# Patient Record
Sex: FEMALE | Race: BLACK OR AFRICAN AMERICAN | NOT HISPANIC OR LATINO | URBAN - METROPOLITAN AREA
[De-identification: names, ages, dates, MRNs, and addresses within clinical notes are randomized per-mention and may not be internally consistent; named-entity substitution may affect disease eponyms.]

---

## 2021-04-18 ENCOUNTER — INPATIENT (INPATIENT)
Facility: HOSPITAL | Age: 36
LOS: 4 days | Discharge: HOME | DRG: 872 | End: 2021-04-23
Attending: OBSTETRICS & GYNECOLOGY | Admitting: OBSTETRICS & GYNECOLOGY
Payer: MEDICARE

## 2021-04-18 VITALS
WEIGHT: 293 LBS | DIASTOLIC BLOOD PRESSURE: 79 MMHG | HEIGHT: 67 IN | SYSTOLIC BLOOD PRESSURE: 126 MMHG | HEART RATE: 134 BPM | RESPIRATION RATE: 16 BRPM | TEMPERATURE: 98 F | OXYGEN SATURATION: 99 %

## 2021-04-18 LAB
ALBUMIN SERPL ELPH-MCNC: 3.3 G/DL — SIGNIFICANT CHANGE UP (ref 3.3–5)
ALP SERPL-CCNC: 102 U/L — SIGNIFICANT CHANGE UP (ref 40–120)
ALT FLD-CCNC: 8 U/L — LOW (ref 10–45)
ANION GAP SERPL CALC-SCNC: 17 MMOL/L — SIGNIFICANT CHANGE UP (ref 5–17)
ANISOCYTOSIS BLD QL: SIGNIFICANT CHANGE UP
APPEARANCE UR: CLEAR — SIGNIFICANT CHANGE UP
AST SERPL-CCNC: 15 U/L — SIGNIFICANT CHANGE UP (ref 10–40)
BACTERIA # UR AUTO: PRESENT /HPF
BASE EXCESS BLDV CALC-SCNC: -2 MMOL/L — SIGNIFICANT CHANGE UP
BASOPHILS # BLD AUTO: 0 K/UL — SIGNIFICANT CHANGE UP (ref 0–0.2)
BASOPHILS NFR BLD AUTO: 0 % — SIGNIFICANT CHANGE UP (ref 0–2)
BILIRUB SERPL-MCNC: 0.2 MG/DL — SIGNIFICANT CHANGE UP (ref 0.2–1.2)
BILIRUB UR-MCNC: NEGATIVE — SIGNIFICANT CHANGE UP
BLD GP AB SCN SERPL QL: NEGATIVE — SIGNIFICANT CHANGE UP
BUN SERPL-MCNC: 13 MG/DL — SIGNIFICANT CHANGE UP (ref 7–23)
BURR CELLS BLD QL SMEAR: PRESENT — SIGNIFICANT CHANGE UP
CA-I SERPL-SCNC: 1.08 MMOL/L — LOW (ref 1.12–1.3)
CALCIUM SERPL-MCNC: 9.4 MG/DL — SIGNIFICANT CHANGE UP (ref 8.4–10.5)
CHLORIDE SERPL-SCNC: 93 MMOL/L — LOW (ref 96–108)
CO2 SERPL-SCNC: 20 MMOL/L — LOW (ref 22–31)
COLOR SPEC: YELLOW — SIGNIFICANT CHANGE UP
COMMENT - URINE: SIGNIFICANT CHANGE UP
CREAT SERPL-MCNC: 1.28 MG/DL — SIGNIFICANT CHANGE UP (ref 0.5–1.3)
CRP SERPL-MCNC: 412.7 MG/L — HIGH (ref 0–4)
DIFF PNL FLD: ABNORMAL
EOSINOPHIL # BLD AUTO: 0 K/UL — SIGNIFICANT CHANGE UP (ref 0–0.5)
EOSINOPHIL NFR BLD AUTO: 0 % — SIGNIFICANT CHANGE UP (ref 0–6)
EPI CELLS # UR: ABNORMAL /HPF (ref 0–5)
FERRITIN SERPL-MCNC: 92 NG/ML — SIGNIFICANT CHANGE UP (ref 15–150)
GAS PNL BLDV: 129 MMOL/L — LOW (ref 138–146)
GAS PNL BLDV: SIGNIFICANT CHANGE UP
GAS PNL BLDV: SIGNIFICANT CHANGE UP
GIANT PLATELETS BLD QL SMEAR: PRESENT — SIGNIFICANT CHANGE UP
GLUCOSE SERPL-MCNC: 193 MG/DL — HIGH (ref 70–99)
GLUCOSE UR QL: NEGATIVE — SIGNIFICANT CHANGE UP
HCG SERPL-ACNC: <0 MIU/ML — SIGNIFICANT CHANGE UP
HCO3 BLDV-SCNC: 22 MMOL/L — SIGNIFICANT CHANGE UP (ref 20–27)
HCT VFR BLD CALC: 27.9 % — LOW (ref 34.5–45)
HCT VFR BLD CALC: 28.7 % — LOW (ref 34.5–45)
HGB BLD-MCNC: 8.4 G/DL — LOW (ref 11.5–15.5)
HGB BLD-MCNC: 8.5 G/DL — LOW (ref 11.5–15.5)
HYPOCHROMIA BLD QL: SIGNIFICANT CHANGE UP
KETONES UR-MCNC: NEGATIVE — SIGNIFICANT CHANGE UP
LACTATE SERPL-SCNC: 2.4 MMOL/L — HIGH (ref 0.5–2)
LACTATE SERPL-SCNC: 4.2 MMOL/L — CRITICAL HIGH (ref 0.5–2)
LACTATE SERPL-SCNC: 4.4 MMOL/L — CRITICAL HIGH (ref 0.5–2)
LEUKOCYTE ESTERASE UR-ACNC: NEGATIVE — SIGNIFICANT CHANGE UP
LYMPHOCYTES # BLD AUTO: 2.12 K/UL — SIGNIFICANT CHANGE UP (ref 1–3.3)
LYMPHOCYTES # BLD AUTO: 4.3 % — LOW (ref 13–44)
MACROCYTES BLD QL: SIGNIFICANT CHANGE UP
MANUAL SMEAR VERIFICATION: SIGNIFICANT CHANGE UP
MCHC RBC-ENTMCNC: 21 PG — LOW (ref 27–34)
MCHC RBC-ENTMCNC: 21.4 PG — LOW (ref 27–34)
MCHC RBC-ENTMCNC: 29.3 GM/DL — LOW (ref 32–36)
MCHC RBC-ENTMCNC: 30.5 GM/DL — LOW (ref 32–36)
MCV RBC AUTO: 70.1 FL — LOW (ref 80–100)
MCV RBC AUTO: 71.8 FL — LOW (ref 80–100)
MICROCYTES BLD QL: SLIGHT — SIGNIFICANT CHANGE UP
MONOCYTES # BLD AUTO: 2.12 K/UL — HIGH (ref 0–0.9)
MONOCYTES NFR BLD AUTO: 4.3 % — SIGNIFICANT CHANGE UP (ref 2–14)
NEUTROPHILS # BLD AUTO: 45 K/UL — HIGH (ref 1.8–7.4)
NEUTROPHILS NFR BLD AUTO: 89.7 % — HIGH (ref 43–77)
NEUTS BAND # BLD: 1.7 % — SIGNIFICANT CHANGE UP (ref 0–8)
NITRITE UR-MCNC: NEGATIVE — SIGNIFICANT CHANGE UP
NRBC # BLD: 0 /100 WBCS — SIGNIFICANT CHANGE UP (ref 0–0)
OVALOCYTES BLD QL SMEAR: SLIGHT — SIGNIFICANT CHANGE UP
PCO2 BLDV: 35 MMHG — LOW (ref 39–42)
PH BLDV: 7.42 — SIGNIFICANT CHANGE UP (ref 7.32–7.43)
PH UR: 6 — SIGNIFICANT CHANGE UP (ref 5–8)
PLAT MORPH BLD: ABNORMAL
PLATELET # BLD AUTO: 1213 K/UL — CRITICAL HIGH (ref 150–400)
PLATELET # BLD AUTO: 977 K/UL — HIGH (ref 150–400)
PO2 BLDV: 100 MMHG — SIGNIFICANT CHANGE UP
POIKILOCYTOSIS BLD QL AUTO: SIGNIFICANT CHANGE UP
POLYCHROMASIA BLD QL SMEAR: SLIGHT — SIGNIFICANT CHANGE UP
POTASSIUM BLDV-SCNC: 4.2 MMOL/L — SIGNIFICANT CHANGE UP (ref 3.5–4.9)
POTASSIUM SERPL-MCNC: 4.2 MMOL/L — SIGNIFICANT CHANGE UP (ref 3.5–5.3)
POTASSIUM SERPL-SCNC: 4.2 MMOL/L — SIGNIFICANT CHANGE UP (ref 3.5–5.3)
PROCALCITONIN SERPL-MCNC: 11.39 NG/ML — HIGH (ref 0.02–0.1)
PROT SERPL-MCNC: 8 G/DL — SIGNIFICANT CHANGE UP (ref 6–8.3)
PROT UR-MCNC: ABNORMAL MG/DL
RBC # BLD: 3.98 M/UL — SIGNIFICANT CHANGE UP (ref 3.8–5.2)
RBC # BLD: 4 M/UL — SIGNIFICANT CHANGE UP (ref 3.8–5.2)
RBC # FLD: 18.6 % — HIGH (ref 10.3–14.5)
RBC # FLD: 18.6 % — HIGH (ref 10.3–14.5)
RBC BLD AUTO: ABNORMAL
RBC CASTS # UR COMP ASSIST: < 5 /HPF — SIGNIFICANT CHANGE UP
RH IG SCN BLD-IMP: POSITIVE — SIGNIFICANT CHANGE UP
RH IG SCN BLD-IMP: POSITIVE — SIGNIFICANT CHANGE UP
SAO2 % BLDV: 98 % — SIGNIFICANT CHANGE UP
SARS-COV-2 RNA SPEC QL NAA+PROBE: NEGATIVE — SIGNIFICANT CHANGE UP
SARS-COV-2 RNA SPEC QL NAA+PROBE: SIGNIFICANT CHANGE UP
SODIUM SERPL-SCNC: 130 MMOL/L — LOW (ref 135–145)
SP GR SPEC: <=1.005 — SIGNIFICANT CHANGE UP (ref 1–1.03)
SPHEROCYTES BLD QL SMEAR: SLIGHT — SIGNIFICANT CHANGE UP
TARGETS BLD QL SMEAR: SLIGHT — SIGNIFICANT CHANGE UP
UROBILINOGEN FLD QL: 0.2 E.U./DL — SIGNIFICANT CHANGE UP
WBC # BLD: 41.83 K/UL — CRITICAL HIGH (ref 3.8–10.5)
WBC # BLD: 49.23 K/UL — CRITICAL HIGH (ref 3.8–10.5)
WBC # FLD AUTO: 41.83 K/UL — CRITICAL HIGH (ref 3.8–10.5)
WBC # FLD AUTO: 49.23 K/UL — CRITICAL HIGH (ref 3.8–10.5)
WBC UR QL: > 10 /HPF

## 2021-04-18 PROCEDURE — 71275 CT ANGIOGRAPHY CHEST: CPT | Mod: 26,MA

## 2021-04-18 PROCEDURE — 74177 CT ABD & PELVIS W/CONTRAST: CPT | Mod: 26,MA

## 2021-04-18 PROCEDURE — 76830 TRANSVAGINAL US NON-OB: CPT | Mod: 26

## 2021-04-18 PROCEDURE — 93970 EXTREMITY STUDY: CPT | Mod: 26

## 2021-04-18 PROCEDURE — 71045 X-RAY EXAM CHEST 1 VIEW: CPT | Mod: 26

## 2021-04-18 PROCEDURE — 93010 ELECTROCARDIOGRAM REPORT: CPT

## 2021-04-18 PROCEDURE — 99291 CRITICAL CARE FIRST HOUR: CPT | Mod: CS

## 2021-04-18 PROCEDURE — 76856 US EXAM PELVIC COMPLETE: CPT | Mod: 26

## 2021-04-18 RX ORDER — CEFOTETAN DISODIUM 1 G
VIAL (EA) INJECTION
Refills: 0 | Status: DISCONTINUED | OUTPATIENT
Start: 2021-04-18 | End: 2021-04-22

## 2021-04-18 RX ORDER — ACETAMINOPHEN 500 MG
1000 TABLET ORAL ONCE
Refills: 0 | Status: COMPLETED | OUTPATIENT
Start: 2021-04-18 | End: 2021-04-18

## 2021-04-18 RX ORDER — PIPERACILLIN AND TAZOBACTAM 4; .5 G/20ML; G/20ML
3.38 INJECTION, POWDER, LYOPHILIZED, FOR SOLUTION INTRAVENOUS ONCE
Refills: 0 | Status: COMPLETED | OUTPATIENT
Start: 2021-04-18 | End: 2021-04-18

## 2021-04-18 RX ORDER — CEFOTETAN DISODIUM 1 G
2 VIAL (EA) INJECTION EVERY 12 HOURS
Refills: 0 | Status: DISCONTINUED | OUTPATIENT
Start: 2021-04-19 | End: 2021-04-22

## 2021-04-18 RX ORDER — IBUPROFEN 200 MG
600 TABLET ORAL EVERY 6 HOURS
Refills: 0 | Status: DISCONTINUED | OUTPATIENT
Start: 2021-04-18 | End: 2021-04-21

## 2021-04-18 RX ORDER — NICOTINE POLACRILEX 2 MG
1 GUM BUCCAL DAILY
Refills: 0 | Status: DISCONTINUED | OUTPATIENT
Start: 2021-04-18 | End: 2021-04-23

## 2021-04-18 RX ORDER — HEPARIN SODIUM 5000 [USP'U]/ML
7500 INJECTION INTRAVENOUS; SUBCUTANEOUS ONCE
Refills: 0 | Status: COMPLETED | OUTPATIENT
Start: 2021-04-18 | End: 2021-04-18

## 2021-04-18 RX ORDER — VANCOMYCIN HCL 1 G
2000 VIAL (EA) INTRAVENOUS ONCE
Refills: 0 | Status: COMPLETED | OUTPATIENT
Start: 2021-04-18 | End: 2021-04-18

## 2021-04-18 RX ORDER — METRONIDAZOLE 500 MG
500 TABLET ORAL EVERY 8 HOURS
Refills: 0 | Status: DISCONTINUED | OUTPATIENT
Start: 2021-04-18 | End: 2021-04-22

## 2021-04-18 RX ORDER — SODIUM CHLORIDE 9 MG/ML
1000 INJECTION, SOLUTION INTRAVENOUS
Refills: 0 | Status: DISCONTINUED | OUTPATIENT
Start: 2021-04-18 | End: 2021-04-19

## 2021-04-18 RX ORDER — LANOLIN ALCOHOL/MO/W.PET/CERES
5 CREAM (GRAM) TOPICAL AT BEDTIME
Refills: 0 | Status: DISCONTINUED | OUTPATIENT
Start: 2021-04-18 | End: 2021-04-23

## 2021-04-18 RX ORDER — SODIUM CHLORIDE 9 MG/ML
1900 INJECTION INTRAMUSCULAR; INTRAVENOUS; SUBCUTANEOUS ONCE
Refills: 0 | Status: COMPLETED | OUTPATIENT
Start: 2021-04-18 | End: 2021-04-18

## 2021-04-18 RX ORDER — ACETAMINOPHEN 500 MG
1000 TABLET ORAL EVERY 6 HOURS
Refills: 0 | Status: DISCONTINUED | OUTPATIENT
Start: 2021-04-18 | End: 2021-04-23

## 2021-04-18 RX ORDER — FLUCONAZOLE 150 MG/1
150 TABLET ORAL ONCE
Refills: 0 | Status: COMPLETED | OUTPATIENT
Start: 2021-04-18 | End: 2021-04-18

## 2021-04-18 RX ORDER — CEFOTETAN DISODIUM 1 G
2 VIAL (EA) INJECTION ONCE
Refills: 0 | Status: COMPLETED | OUTPATIENT
Start: 2021-04-18 | End: 2021-04-18

## 2021-04-18 RX ADMIN — SODIUM CHLORIDE 1900 MILLILITER(S): 9 INJECTION INTRAMUSCULAR; INTRAVENOUS; SUBCUTANEOUS at 14:07

## 2021-04-18 RX ADMIN — Medication 1000 MILLIGRAM(S): at 22:18

## 2021-04-18 RX ADMIN — Medication 600 MILLIGRAM(S): at 23:48

## 2021-04-18 RX ADMIN — Medication 100 GRAM(S): at 23:21

## 2021-04-18 RX ADMIN — Medication 250 MILLIGRAM(S): at 15:24

## 2021-04-18 RX ADMIN — FLUCONAZOLE 150 MILLIGRAM(S): 150 TABLET ORAL at 22:18

## 2021-04-18 RX ADMIN — Medication 100 MILLIGRAM(S): at 22:19

## 2021-04-18 RX ADMIN — HEPARIN SODIUM 7500 UNIT(S): 5000 INJECTION INTRAVENOUS; SUBCUTANEOUS at 22:18

## 2021-04-18 RX ADMIN — Medication 400 MILLIGRAM(S): at 14:48

## 2021-04-18 RX ADMIN — SODIUM CHLORIDE 175 MILLILITER(S): 9 INJECTION, SOLUTION INTRAVENOUS at 20:36

## 2021-04-18 RX ADMIN — Medication 100 MILLIGRAM(S): at 20:36

## 2021-04-18 RX ADMIN — Medication 1000 MILLIGRAM(S): at 23:18

## 2021-04-18 RX ADMIN — Medication 5 MILLIGRAM(S): at 23:33

## 2021-04-18 RX ADMIN — PIPERACILLIN AND TAZOBACTAM 200 GRAM(S): 4; .5 INJECTION, POWDER, LYOPHILIZED, FOR SOLUTION INTRAVENOUS at 14:47

## 2021-04-18 NOTE — CONSULT NOTE ADULT - SUBJECTIVE AND OBJECTIVE BOX
LENGTH OF HOSPITAL STAY:     CHIEF COMPLAINT: Multiple non-specific complaints    HISTORY OF PRESENTING ILLNESS:   Pt w/ PMHx MO, no sig PSHx p/w multiple medical complaints. Pt reporst 3-4 days of "not feeling well," sweating, states she had fever of 101, but not today. Pt reports rhinorrhea, myalgias. Pt reports she had diarrhea x 2, 2 days ago, watery, non bloody, but not BM since. Pt had vomiting x 1 3 and 4 days ago. Pt denies loss of taste/smell, back pain, HA. Denies known exposures to COVID19. Pt lives home w/ grandmother, and is sedentary. Pt has not had COVID19 vaccine. No recent travel. pt also reports 1.5 months of vaginal bleeding. She reports hx irregular menses, but states she has never bled this long. No know hx fibroids. Pt has not see a MD in over 1 year. She states there is not possibility of pregnancy, has not had intercourse in over 3 years. Pt is unable to quantify the bleeding, states she is using pampers. pt reports midline to L pelvic pain. No urinary complaints    PAST MEDICAL & SURGICAL HISTORY:  Morbid obesity    SOCIAL HISTORY:    ALLERGIES:  No Known Allergies    MEDICATIONS:  STANDING MEDICATIONS    PRN MEDICATIONS    VITALS:   T(F): 100.5  HR: 121  BP: 163/79  RR: 20  SpO2: 99%    LABS:                        8.4    41.83 )-----------( 977      ( 18 Apr 2021 15:05 )             28.7     04-18    130<L>  |  93<L>  |  13  ----------------------------<  193<H>  4.2   |  20<L>  |  1.28    Ca    9.4      18 Apr 2021 14:20  Mg     1.8     04-18    TPro  8.0  /  Alb  3.3  /  TBili  0.2  /  DBili  x   /  AST  15  /  ALT  8<L>  /  AlkPhos  102  04-18    PT/INR - ( 18 Apr 2021 14:20 )   PT: 16.7 sec;   INR: 1.41       PTT - ( 18 Apr 2021 14:20 )  PTT:29.3 sec    Lactate, Blood: 4.2 mmol/L *HH* (04-18-21 @ 15:05)  Lactate, Blood: 4.4 mmol/L *HH* (04-18-21 @ 14:13)    RADIOLOGY:  < from: Xray Chest 1 View-PORTABLE IMMEDIATE (04.18.21 @ 14:21) >  No acute infiltrates    < end of copied text >    PHYSICAL EXAM:  GEN: No acute distress  HEENT:   LUNGS: Clear to auscultation bilaterally   HEART: S1/S2 present. RRR.   ABD: Soft, non-tender, non-distended. Bowel sounds present  EXT:  NEURO: AAOX3     LENGTH OF HOSPITAL STAY:     CHIEF COMPLAINT: Multiple non-specific complaints    HISTORY OF PRESENTING ILLNESS:   Pt w/ PMHx MO, no sig PSHx p/w multiple medical complaints. Pt reporst 3-4 days of "not feeling well," sweating, states she had fever of 101, but not today. Pt reports rhinorrhea, myalgias. Pt reports she had diarrhea x 2, 2 days ago, watery, non bloody, but not BM since. Pt had vomiting x 1 3 and 4 days ago. Pt denies loss of taste/smell, back pain, HA. Denies known exposures to COVID19. Pt lives home w/ grandmother, and is sedentary. Pt has not had COVID19 vaccine. No recent travel. pt also reports 1.5 months of vaginal bleeding. She reports hx irregular menses, but states she has never bled this long. No know hx fibroids. Pt has not see a MD in over 1 year. She states there is not possibility of pregnancy, has not had intercourse in over 3 years. Pt is unable to quantify the bleeding, states she is using pampers. pt reports midline to L pelvic pain. No urinary complaints    PAST MEDICAL & SURGICAL HISTORY:  Morbid obesity  No surgeries   Menorrhagia, regular 28 days, lasts 5 days, uses 6 pads per day for first 3 days    SOCIAL HISTORY:  Active smoker, smokes 2-3 cigarettes per day for 12 years  Maternal grandfather had colon cancer     ALLERGIES:  No Known Allergies    MEDICATIONS:  STANDING MEDICATIONS    PRN MEDICATIONS    VITALS:   T(F): 100.5  HR: 121  BP: 163/79  RR: 20  SpO2: 99%    LABS:                        8.4    41.83 )-----------( 977      ( 18 Apr 2021 15:05 )             28.7     04-18    130<L>  |  93<L>  |  13  ----------------------------<  193<H>  4.2   |  20<L>  |  1.28    Ca    9.4      18 Apr 2021 14:20  Mg     1.8     04-18    TPro  8.0  /  Alb  3.3  /  TBili  0.2  /  DBili  x   /  AST  15  /  ALT  8<L>  /  AlkPhos  102  04-18    PT/INR - ( 18 Apr 2021 14:20 )   PT: 16.7 sec;   INR: 1.41       PTT - ( 18 Apr 2021 14:20 )  PTT:29.3 sec    Lactate, Blood: 4.2 mmol/L *HH* (04-18-21 @ 15:05)  Lactate, Blood: 4.4 mmol/L *HH* (04-18-21 @ 14:13)    RADIOLOGY:  < from: Xray Chest 1 View-PORTABLE IMMEDIATE (04.18.21 @ 14:21) >  No acute infiltrates    < end of copied text >    PHYSICAL EXAM:  GEN: No acute distress, morbidly obese  HEENT: NCAT, no cervical/axillary LAD  LUNGS: Clear to auscultation bilaterally   HEART: S1/S2 present. RRR.   ABD: Soft, ++tender, distended. Bowel sounds present. No hepatosplenomegaly.   EXT: No pitting edema  NEURO: AAOX3

## 2021-04-18 NOTE — ED PROVIDER NOTE - PROGRESS NOTE DETAILS
Gyn paged Gyn consulted and will see the pt Pt seen and examined by gyn - admit to Dr NICOLETTE Florence, tele Gyn consulted and will see the pt. Requesting TVUS. Will also get b/l LE doppler given poor opacification of pulm arteries on CTA. Gyn paged - CT showing PID b/l TOA

## 2021-04-18 NOTE — ED PROVIDER NOTE - ADMIT DISPOSITION PRESENT ON ADMISSION SEPSIS Q1 - RE-EVALUATED PATIENT FLUID AND VITAL SIGNS
electronic
I have re-evaluated the patient's fluid status and reviewed vital signs. Clinical perfusion assessment was performed.

## 2021-04-18 NOTE — ED PROVIDER NOTE - PHYSICAL EXAMINATION
Constitutional: Morbidly obese, awake, alert, oriented to person, place, time/situation and in no apparent distress.  ENMT: Airway patent. Normal MM  Eyes: Clear bilaterally. no conjunctival pallor  Cardiac: tachycardic rate, regular rhythm.  Heart sounds S1, S2.  No murmurs, rubs or gallops.  Respiratory: Breaths sounds equal and clear b/l. no W/R/R. No increased WOB, tachypnea, hypoxia, or accessory mm use. Pt speaks in full sentences.   Gastrointestinal: Abd soft, obese, NABS. + mild ttp in the midline and L pelvic region. No guarding, rebound, or rigidity. No pulsatile abdominal masses.   : limited exam 2/2 body habitus. b/l legs and pubis w/ skin breakdown from chaffing. + small amount of blood in vault. no active hemorrhaging or clots. unable to visualize cervix. + mass palpated in L pelvic region / midline, ? fibroids  Musculoskeletal: Range of motion is not limited  Neuro: Alert and oriented x 3, face symmetric and speech fluent. Strength 5/5 x 4 ext and symmetric, nml gross motor movement, nml gait. No focal deficits noted.  Skin: Skin normal color for race, warm, dry. see above in gyn  Psych: Alert and oriented to person, place, time/situation. normal mood and affect. no apparent risk to self or others.

## 2021-04-18 NOTE — ED PROVIDER NOTE - CLINICAL SUMMARY MEDICAL DECISION MAKING FREE TEXT BOX
Pt p/w fever, int n/v, diarrhea 2 days ago, pelvic pain, and vag bleeding x 1.5 months. Last sexual activity 3 years ago. Hx irregular menses, denies hx fibroids. Exam limited by morbid obesity. Limited gyn exam w/ mild bleeding, no active hemorrhaging. + pelvic mass palpated, ? fibroid. Rectal temp 100.5, tachycardia out of proportion to fever. DDx includes sepsis 2/2 bacterial infection / viral infection, COVID19, other illness. Also consider PE given tachycardiac out of proportion to fever and sedentary lifestyle. Pancx, broad spectrum abx initiated, and IBW fluids for sepsis. Will get CT chest / abd / pelv to eval for etiology.   Labs w/ high wbc and plts, mild anemia, no blasts, more c/w leukemoid reaction, rather than malignancy. heme consulted and will see pt. Anticipate admission

## 2021-04-18 NOTE — ED PROVIDER NOTE - OBJECTIVE STATEMENT
Pt w/ PMHx MO, no sig PSHx p/w multiple medical complaints. Pt reporst 3-4 days of "not feeling well," sweating, states she had fever of 101, but not today. Pt reports rhinorrhea, myalgias. Pt reports she had diarrhea x 2, 2 days ago, watery, non bloody, but not BM since. Pt had vomiting x 1 3 and 4 days ago. Pt denies loss of taste/smell, back pain, HA. Denies known exposures to COVID19. Pt lives home w/ grandmother, and is sedentary. Pt has not had COVID19 vaccine. No recent travel. pt also reports 1.5 months of vaginal bleeding. She reports hx irregular menses, but states she has never bled this long. No know hx fibroids. Pt has not see a MD in over 1 year. She states there is not possibility of pregnancy, has not had intercourse in over 3 years. Pt is unable to quantify the bleeding, states she is using pampers. pt reports midline to L pelvic pain. No urinary complaints

## 2021-04-18 NOTE — ED ADULT TRIAGE NOTE - CHIEF COMPLAINT QUOTE
pt BIBA c/o lower abdominal pain, nausea, vomiting diarrhea since 4/4/21.  Pt stated " she was constipated first then took and enema and since then sx began. also shad her period on 3/10 it stopped and then a few days later she  had vaginal bleeding with large blood clots passing" HR in route 140's as per EMS in triage . denies pmh. ekg in progress.

## 2021-04-18 NOTE — ED PROVIDER NOTE - CARE PLAN
Principal Discharge DX:	Sepsis, due to unspecified organism, unspecified whether acute organ dysfunction present   Principal Discharge DX:	Sepsis, due to unspecified organism, unspecified whether acute organ dysfunction present  Secondary Diagnosis:	PID (pelvic inflammatory disease)  Secondary Diagnosis:	TOA (tubo-ovarian abscess)

## 2021-04-18 NOTE — ED ADULT NURSE REASSESSMENT NOTE - NS ED NURSE REASSESS COMMENT FT1
Pt encouraged to urinate, unable to void at this time. Fluids and antibioitics continue to infuse. delay due to use of iv during ct.

## 2021-04-18 NOTE — H&P ADULT - ASSESSMENT
35y  w/PMH of morbid obesity presenting for severe abdominal pain, n/v and fevers at home. 35y  w/PMH of morbid obesity presenting for severe abdominal pain, n/v and fevers at home found to be septic secondary to bilateral tuboovarian abscesses.     #Sepsis secondary to bilateral TOAs  - Admit to GYN, telemetry unit  - Start antibiotic treatment with IV cefotetan/flagyl, PO doxy   - continue maintenance fluids, repeat labs including lactate, STD panel (patient consented) in AM  - Anticoagulation w/SQH   - s/p heme consult - Leukocytosis, neutrophil and monocyte-predominant, likely inflammatory/reactive, f/u LE dopplers  - f/u UA, UCx, urine G/C   - IR consult in AM  - f/u blood cultures  - f/u vaginitis panel, vaginal culture  - f/u TVUS  - COVID neg    #HTN, obesity  - possible medicine consult in AM  - Ha1c in AM    #r/o PE  - CTA limited study, no PE visualized        Plan discussed w/Dr. Erazo PGY3 and Dr. Florence

## 2021-04-18 NOTE — ED PROVIDER NOTE - NS ED ROS FT
Constitutional: See HPI  Eyes: No pain, blurry vision, or discharge.  ENMT: No hearing changes, pain, discharge or infections. No neck pain or stiffness.  Cardiac: No chest pain, SOB or edema. No chest pain with exertion.  Respiratory: No cough or respiratory distress. No hemoptysis. No history of asthma or RAD.  GI: See HPI  : No dysuria, frequency or burning.  MS: No myalgia, muscle weakness, joint pain or back pain.  Neuro: No headache or weakness. No LOC.  Skin: No skin rash.   Endocrine: No history of thyroid disease or diabetes.  Except as documented in the HPI, all other systems are negative.

## 2021-04-18 NOTE — CONSULT NOTE ADULT - ASSESSMENT
36 yo F with PMHx of morbid obesity presents with vomiting and subjective fever. Hematology consulted for anemia, leukocytosis and thrombocytosis.    #) DIAGNOSIS  - Leukocytosis, neutrophil and monocyte-predominant, likely inflammatory/reactive  - Microcytic anemia, likely 2/2 menorrhagia  - Thrombocytosis, likely reactive  - Mild coagulopathy  - Elevated D-dimer    #) PLAN   - Given markedly elevated .7 and Lactate 4.4, strongly suspect reactive leukocytosis and thrombocytosis secondary to underlying infectious or inflammatory etiology. Can send serum LDH and peripheral flow cytometry   - Peripheral blood smear reveals moderate anisopoikilocytosis with targetoid cells and Thierry cells.   - For evaluation of microcytic anemia, send Iron studies, B12/Folate. To rule-out hemolysis, send LDH and Haptoglobin, although low-suspicion given normal TBili. Mentzer index > 13 suggestive of iron deficiency anemia.   - Can send PT mixing study for evaluation of elevated INR     To discuss with Dr. Richard  36 yo F with PMHx of morbid obesity presents with vomiting and subjective fever. Hematology consulted for anemia, leukocytosis and thrombocytosis.    #) DIAGNOSIS  - Leukocytosis, neutrophil and monocyte-predominant, likely inflammatory/reactive  - Microcytic anemia, likely 2/2 menorrhagia. Suspected iron deficiency anemia   - Thrombocytosis, likely reactive (e.g. iron deficiency anemia)  - Mild coagulopathy  - Elevated D-dimer    #) PLAN   - Given markedly elevated .7 and Lactate 4.4, strongly suspect reactive leukocytosis and thrombocytosis secondary to underlying infectious or inflammatory etiology, especially in the context of diffuse abdominal tenderness and dysuria. Can send serum LDH and peripheral flow cytometry. Follow-up CT A/P   - Peripheral blood smear reveals moderate anisopoikilocytosis with targetoid cells and Leo cells.   - For evaluation of microcytic anemia, send Iron studies, B12/Folate. To rule-out hemolysis, send LDH and Haptoglobin, although low-suspicion given normal TBili. Mentzer index > 13 suggestive of iron deficiency anemia.   - Can send PT mixing study for evaluation of elevated INR     Discussed with Dr. Richard  34 yo F with PMHx of morbid obesity presents with vomiting and subjective fever. Hematology consulted for anemia, leukocytosis and thrombocytosis.    #) DIAGNOSIS  - Leukocytosis, neutrophil and monocyte-predominant, likely inflammatory/reactive  - Microcytic anemia, likely 2/2 menorrhagia. Suspected iron deficiency anemia   - Thrombocytosis, likely reactive (e.g. iron deficiency anemia)  - Mild coagulopathy  - Elevated D-dimer    #) PLAN   - Given markedly elevated .7 and Lactate 4.4, strongly suspect reactive leukocytosis and thrombocytosis secondary to underlying infectious or inflammatory etiology, especially in the context of diffuse abdominal tenderness and dysuria. Follow-up CT A/P   - Peripheral blood smear reveals moderate anisopoikilocytosis with targetoid cells and Thierry cells.   - For evaluation of microcytic anemia, send Iron studies. B12/Folate. Mentzer index > 13 suggestive of iron deficiency anemia.   - Can send PT mixing study for evaluation of elevated INR   - Can perform US Duplex of lower extremity to evaluate elevated D-dimer    Discussed with Dr. Richard

## 2021-04-18 NOTE — ED ADULT NURSE NOTE - OBJECTIVE STATEMENT
35y F, A&ox3, presents to ed, morbidly obese, denies medical hx or seeing doctor endorsing weakness, fatigue x3 days with fever of 101 at home. Denies cp nor sob. Denies cough urinary  nor abd s/s. Pt reports currently menstruating. PT also endorses previous episode of constipation, took stool softern, last bm x2 days ago. Pt noted tachycardic sinus to 130s, placed on cardiac monitor, ekg performed, IV 20 g placed prior to arrival via ems with NS infusing. 2nd iv placed right ac. medications given per order, labs sent.

## 2021-04-18 NOTE — H&P ADULT - HISTORY OF PRESENT ILLNESS
35y  w/PMH of morbid obesity presenting for severe abdominal pain, n/v and fevers at home. Patient reports she has been having abdominal pain since the beginning of the month. She initially thought it was due to constipation, took a laxative with minimal relief. She then began to have episodes of vomiting, subjective fevers and chills at home with persistent lower abdominal pain. She denies abnormal vaginal discharge or foul smell. Endorses irregular menses, most recently menses started on 4/3 lasted 7 days which is normal for her, very heavy with passage of clots. She resumed bleeding a few days ago. She reports she intermittently has vaginal bleeding twice a month. She denies history of fibroids      Denies fever, chills, chest pain, palpitations, SOB, n/v.  +flatus, +BM    OB H/x:    GYN H/x:    MED H/x:    SURG H/x:    Medications:  Allergies:        Vital Signs Last 24 Hrs  T(C): 38.1 (2021 14:32), Max: 38.1 (2021 14:32)  T(F): 100.5 (2021 14:32), Max: 100.5 (2021 14:32)  HR: 114 (2021 16:44) (114 - 134)  BP: 143/79 (2021 16:44) (126/79 - 174/84)  BP(mean): --  RR: 18 (2021 16:44) (16 - 20)  SpO2: 97% (2021 16:44) (97% - 99%)    Physical Exam:  Gen: NAD, comfortable  GI: soft, nontender, nondistended + BS, no rebound no guarding  BME: normal anteverted uterus, no adnexal masses appreciated , No cervical motion tenderness   Spec:   Ext: no edema, erythema, tenderness     LABS:                        8.4    41.83 )-----------( 977      ( 2021 15:05 )             28.7     04-18    130<L>  |  93<L>  |  13  ----------------------------<  193<H>  4.2   |  20<L>  |  1.28    Ca    9.4      2021 14:20  Mg     1.8         TPro  8.0  /  Alb  3.3  /  TBili  0.2  /  DBili  x   /  AST  15  /  ALT  8<L>  /  AlkPhos  102      PT/INR - ( 2021 14:20 )   PT: 16.7 sec;   INR: 1.41          PTT - ( 2021 14:20 )  PTT:29.3 sec      RADIOLOGY & ADDITIONAL STUDIES:   35y  w/PMH of morbid obesity presenting for severe abdominal pain, n/v and fevers at home. Patient reports she has been having abdominal pain since the beginning of the month. She initially thought it was due to constipation, took a laxative with minimal relief. She then began to have episodes of vomiting, subjective fevers and chills at home with persistent lower abdominal pain. She denies abnormal vaginal discharge or foul smell. Endorses irregular menses, most recently menses started on 4/3 lasted 7 days which is normal for her, very heavy with passage of clots. She resumed bleeding a few days ago. She reports she intermittently has vaginal bleeding twice a month. She denies history of fibroids, abnormal papsmears, ovarian cysts.   Denies, chest pain, palpitations, SOB. +flatus, +BM    In the ED patient tachycardic to 130s, BP normal to elevated BP. WBC 49, H 8.5, Platelets 1213  d-dimer 1312, lactate 4.4, c-reactive protein. She received IVF, vanc/zosyn with improvement in lactate to 2.4.     OB H/x:  SAB x3    GYN H/x: denies history of fibroids, abnormal papsmears, ovarian cysts, STDs  Last sexually active 3 years ago w/men only  Last saw a gyn 1 year ago, exam wnl per patient     MED H/x: denies    SURG H/x: denies    Medications: denies  Allergies: NKDA       Vital Signs Last 24 Hrs  T(C): 38.1 (2021 14:32), Max: 38.1 (2021 14:32)  T(F): 100.5 (2021 14:32), Max: 100.5 (2021 14:32)  HR: 114 (2021 16:44) (114 - 134)  BP: 143/79 (2021 16:44) (126/79 - 174/84)  BP(mean): --  RR: 18 (2021 16:44) (16 - 20)  SpO2: 97% (2021 16:44) (97% - 99%)    Physical Exam:  Gen: NAD, comfortable  GI: obese, nondistended, soft, tender to palpation in LLQ and RLQ and suprapubically, +BS , no rebound no guarding  BME: irritation of b/l inner thighs with abrasions, small raised rash on b/l inner labia.  Difficult to palpate uterus due to body habitus, tender throughout, fullness in left and right adnexa  Spec: difficult to visualize cervix, 10cc dark red vaginal blood, no active bleeding, foul smelling  Ext: no edema, erythema, tenderness     LABS:                        8.4    41.83 )-----------( 977      ( 2021 15:05 )             28.7     0418    130<L>  |  93<L>  |  13  ----------------------------<  193<H>  4.2   |  20<L>  |  1.28    Ca    9.4      2021 14:20  Mg     1.8         TPro  8.0  /  Alb  3.3  /  TBili  0.2  /  DBili  x   /  AST  15  /  ALT  8<L>  /  AlkPhos  102  18    PT/INR - ( 2021 14:20 )   PT: 16.7 sec;   INR: 1.41          PTT - ( 2021 14:20 )  PTT:29.3 sec      RADIOLOGY & ADDITIONAL STUDIES:      EXAM:  CT ANGIO CHEST PE PROTOCOL IC                          PROCEDURE DATE:  2021          INTERPRETATION:  CTA (CT angiography) of the CHEST dated 2021 4:42 PM    INDICATION: Low-grade fever. Persistent tachycardia. Assess for pulmonary embolism.    TECHNIQUE: CT angiography of the chest was performed during bolus injection of intravenous contrast.  Post-processing including the production of axial, coronal and sagittal multiplanar reformatted images and axial and coronal maximum intensity projections (MIPs) was performed. 100 mL of 3/3/1950 injected.    PRIOR STUDY: None.    FINDINGS: Extremely limited study. Inadequate contrast opacification of the pulmonary arteries. No gross central PE. Nondiagnostic for evaluation of the lobar, segmental and subsegmental pulmonary arteries., subsegmental pulmonary arteries is The thoracic aorta is normal in appearance. The heart is normal in size. Left ventricular hypertrophy. No pericardial effusion is seen. No mediastinal, hilar or axillary lymphadenopathy is seen.    No pleural effusions are seen. No significant pulmonary abnormalities are evident.      Evaluation of the osseous structures demonstrates no significant findings.      IMPRESSION: Extremely limited study. No gross central PE.                Thank you for the opportunity to participate in the care of this patient.      GERA RYAN MD; Attending Radiologist  This document has been electronically signed. 2021  5:08PM      EXAM:  CT ABDOMEN AND PELVIS IC                          PROCEDURE DATE:  2021          INTERPRETATION:  CT of the ABDOMEN and PELVIS with intravenous contrast dated 2021 4:42 PM    INDICATION: low grade fever, sespsis vs leukomoid rxn vs malignancy    TECHNIQUE: CT of the abdomen and pelvis was performed. Axial and coronal  and sagittal images were produced and reviewed.    CONTRAST USAGE:  IV contrast: Optiray 350: 100 ml administered; ml discarded.    Oral contrast: administered.    PRIOR STUDIES: None.    FINDINGS: Images of the lower chest demonstrate no abnormality.    The liver is enlarged. No focal liver lesion. Trace perihepatic fluid. No radiopaque stones are seen in the gallbladder. The gallbladder is distended. The pancreas is normal in appearance.  No splenic abnormalities are seen.    The adrenal glands are unremarkable. The kidneys are normal in size. 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 Subcentimeter cortical hypodensity right kidney, too small to characterize. No hydronephrosis. Possible renal fetal lobulation.    No abdominal aortic aneurysm is seen. No retroperitoneal lymphadenopathy is seen. Small mesenteric nodes.    Evaluation of the bowel is limited due to lack of oral contrast material. Normal appendix. No bowel obstruction.. Small amount of perisplenic ascites. Small amount ascites in the right paracolic gutter. Extensive infiltration of the anterior omental fat. Bilocular small fluid collection in the mesentery contiguous with the abnormal left fallopian tube.    Images of the pelvis demonstrate an enlarged lobulated multi fibroid uterus. The uterus measures 15.3 cm x 8.9 cm x 8.5 cm. Appears to be a 5 cm submucosal leiomyoma in relation to the lower uterine segment. There is a 7.7 x 4.4 x 4.2 cm loculated fluid collection with enhancing wall in the region of the cul-de-sac. There are bilateral thick-walled tubular adnexal structures on the right measuring 8.9 x 3.9 x 3.0 cm and on the left 4.8 x 7.3 x 2.9 cm. The latter structures are contiguous with the ovaries. The urinary bladder is collapsed.    Evaluation of the osseous structures demonstrates degenerative disc disease L5-S1.      IMPRESSION: The pelvic findings are highly suggestive of PID with bilateral pyosalpinges/tubo-ovarian abscesses. Enlarged anteverted lobulated multi fibroid uterus. Infiltration of the omental fat.                Thank you for the opportunity to participate in the care of this patient.      GERA RYAN MD; Attending Radiologist  This document has been electronically signed. 2021  4:59PM     35y  w/PMH of morbid obesity presenting for severe abdominal pain, n/v and fevers at home. Patient reports she has been having abdominal pain since the beginning of the month. She initially thought it was due to constipation, took a laxative with minimal relief. She then began to have episodes of vomiting, subjective fevers and chills at home with persistent lower abdominal pain. She denies abnormal vaginal discharge or foul smell. Endorses irregular menses, most recently menses started on 4/3 lasted 7 days which is normal for her, very heavy with passage of clots. She resumed bleeding a few days ago. She reports she intermittently has vaginal bleeding twice a month. She denies history of fibroids, abnormal papsmears, ovarian cysts.   Denies, chest pain, palpitations, SOB. +flatus, +BM    In the ED patient tachycardic to 130s, BP normal to elevated BP. WBC 49, H 8.5, Platelets 1213  d-dimer 1312, lactate 4.4, c-reactive protein. She received IVF, vanc/zosyn with improvement in lactate to 2.4.     OB H/x:  SAB x3    GYN H/x: denies history of fibroids, abnormal papsmears, ovarian cysts, STDs  Last sexually active 3 years ago w/men only  Last saw a gyn 1 year ago, exam wnl per patient   Hx of abnormal menses, irregular    MED H/x: denies    SURG H/x: denies    Medications: denies  Allergies: NKDA       Vital Signs Last 24 Hrs  T(C): 38.1 (2021 14:32), Max: 38.1 (2021 14:32)  T(F): 100.5 (2021 14:32), Max: 100.5 (2021 14:32)  HR: 114 (2021 16:44) (114 - 134)  BP: 143/79 (2021 16:44) (126/79 - 174/84)  BP(mean): --  RR: 18 (2021 16:44) (16 - 20)  SpO2: 97% (2021 16:44) (97% - 99%)    Physical Exam:  Gen: NAD, comfortable  GI: obese, nondistended, soft, tender to palpation in LLQ and RLQ and suprapubically, +BS , no rebound no guarding  BME: irritation of b/l inner thighs with abrasions, small raised rash on b/l inner labia.  Difficult to palpate uterus due to body habitus, tender throughout, fullness in left and right adnexa  Spec: difficult to visualize cervix, 10cc dark red vaginal blood, no active bleeding, foul smelling  Ext: no edema, erythema, tenderness     LABS:                        8.4    41.83 )-----------( 977      ( 2021 15:05 )             28.7     04    130<L>  |  93<L>  |  13  ----------------------------<  193<H>  4.2   |  20<L>  |  1.28    Ca    9.4      2021 14:20  Mg     1.8         TPro  8.0  /  Alb  3.3  /  TBili  0.2  /  DBili  x   /  AST  15  /  ALT  8<L>  /  AlkPhos  102      PT/INR - ( 2021 14:20 )   PT: 16.7 sec;   INR: 1.41          PTT - ( 2021 14:20 )  PTT:29.3 sec      RADIOLOGY & ADDITIONAL STUDIES:      EXAM:  CT ANGIO CHEST PE PROTOCOL IC                          PROCEDURE DATE:  2021          INTERPRETATION:  CTA (CT angiography) of the CHEST dated 2021 4:42 PM    INDICATION: Low-grade fever. Persistent tachycardia. Assess for pulmonary embolism.    TECHNIQUE: CT angiography of the chest was performed during bolus injection of intravenous contrast.  Post-processing including the production of axial, coronal and sagittal multiplanar reformatted images and axial and coronal maximum intensity projections (MIPs) was performed. 100 mL of 3/3/1950 injected.    PRIOR STUDY: None.    FINDINGS: Extremely limited study. Inadequate contrast opacification of the pulmonary arteries. No gross central PE. Nondiagnostic for evaluation of the lobar, segmental and subsegmental pulmonary arteries., subsegmental pulmonary arteries is The thoracic aorta is normal in appearance. The heart is normal in size. Left ventricular hypertrophy. No pericardial effusion is seen. No mediastinal, hilar or axillary lymphadenopathy is seen.    No pleural effusions are seen. No significant pulmonary abnormalities are evident.      Evaluation of the osseous structures demonstrates no significant findings.      IMPRESSION: Extremely limited study. No gross central PE.                Thank you for the opportunity to participate in the care of this patient.      GERA RYAN MD; Attending Radiologist  This document has been electronically signed. 2021  5:08PM      EXAM:  CT ABDOMEN AND PELVIS IC                          PROCEDURE DATE:  2021          INTERPRETATION:  CT of the ABDOMEN and PELVIS with intravenous contrast dated 2021 4:42 PM    INDICATION: low grade fever, sespsis vs leukomoid rxn vs malignancy    TECHNIQUE: CT of the abdomen and pelvis was performed. Axial and coronal  and sagittal images were produced and reviewed.    CONTRAST USAGE:  IV contrast: Optiray 350: 100 ml administered; ml discarded.    Oral contrast: administered.    PRIOR STUDIES: None.    FINDINGS: Images of the lower chest demonstrate no abnormality.    The liver is enlarged. No focal liver lesion. Trace perihepatic fluid. No radiopaque stones are seen in the gallbladder. The gallbladder is distended. The pancreas is normal in appearance.  No splenic abnormalities are seen.    The adrenal glands are unremarkable. The kidneys are normal in size. 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 Subcentimeter cortical hypodensity right kidney, too small to characterize. No hydronephrosis. Possible renal fetal lobulation.    No abdominal aortic aneurysm is seen. No retroperitoneal lymphadenopathy is seen. Small mesenteric nodes.    Evaluation of the bowel is limited due to lack of oral contrast material. Normal appendix. No bowel obstruction.. Small amount of perisplenic ascites. Small amount ascites in the right paracolic gutter. Extensive infiltration of the anterior omental fat. Bilocular small fluid collection in the mesentery contiguous with the abnormal left fallopian tube.    Images of the pelvis demonstrate an enlarged lobulated multi fibroid uterus. The uterus measures 15.3 cm x 8.9 cm x 8.5 cm. Appears to be a 5 cm submucosal leiomyoma in relation to the lower uterine segment. There is a 7.7 x 4.4 x 4.2 cm loculated fluid collection with enhancing wall in the region of the cul-de-sac. There are bilateral thick-walled tubular adnexal structures on the right measuring 8.9 x 3.9 x 3.0 cm and on the left 4.8 x 7.3 x 2.9 cm. The latter structures are contiguous with the ovaries. The urinary bladder is collapsed.    Evaluation of the osseous structures demonstrates degenerative disc disease L5-S1.      IMPRESSION: The pelvic findings are highly suggestive of PID with bilateral pyosalpinges/tubo-ovarian abscesses. Enlarged anteverted lobulated multi fibroid uterus. Infiltration of the omental fat.                Thank you for the opportunity to participate in the care of this patient.      GERA RYAN MD; Attending Radiologist  This document has been electronically signed. 2021  4:59PM     35y  w/PMH of morbid obesity presenting for severe abdominal pain, n/v and fevers at home. Patient reports she has been having abdominal pain since the beginning of the month. She initially thought it was due to constipation, took a laxative with minimal relief. She then began to have episodes of vomiting, subjective fevers and chills at home with persistent lower abdominal pain. She denies abnormal vaginal discharge or foul smell. Endorses irregular menses, most recently menses started on 4/3 lasted 7 days which is normal for her, very heavy with passage of clots. She resumed bleeding a few days ago. She reports she intermittently has vaginal bleeding twice a month. She denies history of fibroids, abnormal papsmears, ovarian cysts.   Denies, chest pain, palpitations, SOB. +flatus, +BM    In the ED patient tachycardic to 130s, BP normal to elevated BP. WBC 49, H 8.5, Platelets 1213  d-dimer 1312, lactate 4.4, c-reactive protein. She received IVF, vanc/zosyn with improvement in lactate to 2.4.     OB H/x:  SAB x3    GYN H/x: denies history of fibroids, abnormal papsmears, ovarian cysts, STDs  Last sexually active 3 years ago w/men only  Last saw a gyn 1 year ago, exam wnl per patient   Hx of abnormal menses, irregular    MED H/x: denies    SURG H/x: denies    Medications: denies  Allergies: NKDA       Vital Signs Last 24 Hrs  T(C): 38.1 (2021 14:32), Max: 38.1 (2021 14:32)  T(F): 100.5 (2021 14:32), Max: 100.5 (2021 14:32)  HR: 114 (2021 16:44) (114 - 134)  BP: 143/79 (2021 16:44) (126/79 - 174/84)  BP(mean): --  RR: 18 (2021 16:44) (16 - 20)  SpO2: 97% (2021 16:44) (97% - 99%)    Physical Exam:  Gen: NAD, comfortable  GI: obese, nondistended, soft, tender to palpation in LLQ and RLQ and suprapubically, +BS , no rebound no guarding  BME: irritation of b/l inner thighs with abrasions.  Difficult to palpate uterus due to body habitus, tender throughout, fullness in left and right adnexa  Spec: difficult to visualize cervix, 10cc dark red vaginal blood, no active bleeding, foul smelling  Ext: no edema, erythema, tenderness     LABS:                        8.4    41.83 )-----------( 977      ( 2021 15:05 )             28.7     04-18    130<L>  |  93<L>  |  13  ----------------------------<  193<H>  4.2   |  20<L>  |  1.28    Ca    9.4      2021 14:20  Mg     1.8     04-18    TPro  8.0  /  Alb  3.3  /  TBili  0.2  /  DBili  x   /  AST  15  /  ALT  8<L>  /  AlkPhos  102  04-18    PT/INR - ( 2021 14:20 )   PT: 16.7 sec;   INR: 1.41          PTT - ( 2021 14:20 )  PTT:29.3 sec      RADIOLOGY & ADDITIONAL STUDIES:      EXAM:  CT ANGIO CHEST PE PROTOCOL IC                          PROCEDURE DATE:  2021          INTERPRETATION:  CTA (CT angiography) of the CHEST dated 2021 4:42 PM    INDICATION: Low-grade fever. Persistent tachycardia. Assess for pulmonary embolism.    TECHNIQUE: CT angiography of the chest was performed during bolus injection of intravenous contrast.  Post-processing including the production of axial, coronal and sagittal multiplanar reformatted images and axial and coronal maximum intensity projections (MIPs) was performed. 100 mL of 3/3/1950 injected.    PRIOR STUDY: None.    FINDINGS: Extremely limited study. Inadequate contrast opacification of the pulmonary arteries. No gross central PE. Nondiagnostic for evaluation of the lobar, segmental and subsegmental pulmonary arteries., subsegmental pulmonary arteries is The thoracic aorta is normal in appearance. The heart is normal in size. Left ventricular hypertrophy. No pericardial effusion is seen. No mediastinal, hilar or axillary lymphadenopathy is seen.    No pleural effusions are seen. No significant pulmonary abnormalities are evident.      Evaluation of the osseous structures demonstrates no significant findings.      IMPRESSION: Extremely limited study. No gross central PE.                Thank you for the opportunity to participate in the care of this patient.      GERA RYAN MD; Attending Radiologist  This document has been electronically signed. 2021  5:08PM      EXAM:  CT ABDOMEN AND PELVIS IC                          PROCEDURE DATE:  2021          INTERPRETATION:  CT of the ABDOMEN and PELVIS with intravenous contrast dated 2021 4:42 PM    INDICATION: low grade fever, sespsis vs leukomoid rxn vs malignancy    TECHNIQUE: CT of the abdomen and pelvis was performed. Axial and coronal  and sagittal images were produced and reviewed.    CONTRAST USAGE:  IV contrast: Optiray 350: 100 ml administered; ml discarded.    Oral contrast: administered.    PRIOR STUDIES: None.    FINDINGS: Images of the lower chest demonstrate no abnormality.    The liver is enlarged. No focal liver lesion. Trace perihepatic fluid. No radiopaque stones are seen in the gallbladder. The gallbladder is distended. The pancreas is normal in appearance.  No splenic abnormalities are seen.    The adrenal glands are unremarkable. The kidneys are normal in size. 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 Subcentimeter cortical hypodensity right kidney, too small to characterize. No hydronephrosis. Possible renal fetal lobulation.    No abdominal aortic aneurysm is seen. No retroperitoneal lymphadenopathy is seen. Small mesenteric nodes.    Evaluation of the bowel is limited due to lack of oral contrast material. Normal appendix. No bowel obstruction.. Small amount of perisplenic ascites. Small amount ascites in the right paracolic gutter. Extensive infiltration of the anterior omental fat. Bilocular small fluid collection in the mesentery contiguous with the abnormal left fallopian tube.    Images of the pelvis demonstrate an enlarged lobulated multi fibroid uterus. The uterus measures 15.3 cm x 8.9 cm x 8.5 cm. Appears to be a 5 cm submucosal leiomyoma in relation to the lower uterine segment. There is a 7.7 x 4.4 x 4.2 cm loculated fluid collection with enhancing wall in the region of the cul-de-sac. There are bilateral thick-walled tubular adnexal structures on the right measuring 8.9 x 3.9 x 3.0 cm and on the left 4.8 x 7.3 x 2.9 cm. The latter structures are contiguous with the ovaries. The urinary bladder is collapsed.    Evaluation of the osseous structures demonstrates degenerative disc disease L5-S1.      IMPRESSION: The pelvic findings are highly suggestive of PID with bilateral pyosalpinges/tubo-ovarian abscesses. Enlarged anteverted lobulated multi fibroid uterus. Infiltration of the omental fat.                Thank you for the opportunity to participate in the care of this patient.      GERA RYAN MD; Attending Radiologist  This document has been electronically signed. 2021  4:59PM

## 2021-04-19 LAB
A1C WITH ESTIMATED AVERAGE GLUCOSE RESULT: 6.2 % — HIGH (ref 4–5.6)
ANION GAP SERPL CALC-SCNC: 12 MMOL/L — SIGNIFICANT CHANGE UP (ref 5–17)
APTT BLD: 30 SEC — SIGNIFICANT CHANGE UP (ref 27.5–35.5)
BUN SERPL-MCNC: 15 MG/DL — SIGNIFICANT CHANGE UP (ref 7–23)
CALCIUM SERPL-MCNC: 8.9 MG/DL — SIGNIFICANT CHANGE UP (ref 8.4–10.5)
CHLORIDE SERPL-SCNC: 96 MMOL/L — SIGNIFICANT CHANGE UP (ref 96–108)
CO2 SERPL-SCNC: 24 MMOL/L — SIGNIFICANT CHANGE UP (ref 22–31)
COVID-19 SPIKE DOMAIN AB INTERP: NEGATIVE — SIGNIFICANT CHANGE UP
COVID-19 SPIKE DOMAIN ANTIBODY RESULT: 0.4 U/ML — SIGNIFICANT CHANGE UP
CREAT SERPL-MCNC: 0.95 MG/DL — SIGNIFICANT CHANGE UP (ref 0.5–1.3)
CRP SERPL-MCNC: 407.8 MG/L — HIGH (ref 0–4)
ESTIMATED AVERAGE GLUCOSE: 131 MG/DL — HIGH (ref 68–114)
GLUCOSE SERPL-MCNC: 113 MG/DL — HIGH (ref 70–99)
HCT VFR BLD CALC: 22 % — LOW (ref 34.5–45)
HCT VFR BLD CALC: 26.7 % — LOW (ref 34.5–45)
HGB BLD-MCNC: 6.8 G/DL — CRITICAL LOW (ref 11.5–15.5)
HGB BLD-MCNC: 8.2 G/DL — LOW (ref 11.5–15.5)
HIV 1+2 AB+HIV1 P24 AG SERPL QL IA: SIGNIFICANT CHANGE UP
INR BLD: 1.34 — HIGH (ref 0.88–1.16)
LACTATE SERPL-SCNC: 1.9 MMOL/L — SIGNIFICANT CHANGE UP (ref 0.5–2)
MAGNESIUM SERPL-MCNC: 1.7 MG/DL — SIGNIFICANT CHANGE UP (ref 1.6–2.6)
MCHC RBC-ENTMCNC: 21.7 PG — LOW (ref 27–34)
MCHC RBC-ENTMCNC: 22.7 PG — LOW (ref 27–34)
MCHC RBC-ENTMCNC: 30.7 GM/DL — LOW (ref 32–36)
MCHC RBC-ENTMCNC: 30.9 GM/DL — LOW (ref 32–36)
MCV RBC AUTO: 70.1 FL — LOW (ref 80–100)
MCV RBC AUTO: 73.8 FL — LOW (ref 80–100)
NRBC # BLD: 0 /100 WBCS — SIGNIFICANT CHANGE UP (ref 0–0)
NRBC # BLD: 0 /100 WBCS — SIGNIFICANT CHANGE UP (ref 0–0)
PHOSPHATE SERPL-MCNC: 3.2 MG/DL — SIGNIFICANT CHANGE UP (ref 2.5–4.5)
PLATELET # BLD AUTO: 598 K/UL — HIGH (ref 150–400)
PLATELET # BLD AUTO: 956 K/UL — HIGH (ref 150–400)
POTASSIUM SERPL-MCNC: 3.6 MMOL/L — SIGNIFICANT CHANGE UP (ref 3.5–5.3)
POTASSIUM SERPL-SCNC: 3.6 MMOL/L — SIGNIFICANT CHANGE UP (ref 3.5–5.3)
PROTHROM AB SERPL-ACNC: 15.9 SEC — HIGH (ref 10.6–13.6)
RBC # BLD: 3.14 M/UL — LOW (ref 3.8–5.2)
RBC # BLD: 3.62 M/UL — LOW (ref 3.8–5.2)
RBC # FLD: 18.4 % — HIGH (ref 10.3–14.5)
RBC # FLD: 20.4 % — HIGH (ref 10.3–14.5)
SARS-COV-2 IGG+IGM SERPL QL IA: 0.4 U/ML — SIGNIFICANT CHANGE UP
SARS-COV-2 IGG+IGM SERPL QL IA: NEGATIVE — SIGNIFICANT CHANGE UP
SODIUM SERPL-SCNC: 132 MMOL/L — LOW (ref 135–145)
T PALLIDUM AB TITR SER: NEGATIVE — SIGNIFICANT CHANGE UP
T4 AB SER-ACNC: 7.35 UG/DL — SIGNIFICANT CHANGE UP (ref 3.17–11.72)
TSH SERPL-MCNC: 3.54 UIU/ML — SIGNIFICANT CHANGE UP (ref 0.35–4.94)
WBC # BLD: 28.7 K/UL — HIGH (ref 3.8–10.5)
WBC # BLD: 30.09 K/UL — HIGH (ref 3.8–10.5)
WBC # FLD AUTO: 28.7 K/UL — HIGH (ref 3.8–10.5)
WBC # FLD AUTO: 30.09 K/UL — HIGH (ref 3.8–10.5)

## 2021-04-19 PROCEDURE — 99223 1ST HOSP IP/OBS HIGH 75: CPT | Mod: GC

## 2021-04-19 RX ORDER — HYDROMORPHONE HYDROCHLORIDE 2 MG/ML
0.5 INJECTION INTRAMUSCULAR; INTRAVENOUS; SUBCUTANEOUS ONCE
Refills: 0 | Status: DISCONTINUED | OUTPATIENT
Start: 2021-04-19 | End: 2021-04-19

## 2021-04-19 RX ORDER — ONDANSETRON 8 MG/1
8 TABLET, FILM COATED ORAL EVERY 8 HOURS
Refills: 0 | Status: DISCONTINUED | OUTPATIENT
Start: 2021-04-19 | End: 2021-04-23

## 2021-04-19 RX ORDER — SODIUM CHLORIDE 9 MG/ML
3 INJECTION INTRAMUSCULAR; INTRAVENOUS; SUBCUTANEOUS EVERY 8 HOURS
Refills: 0 | Status: DISCONTINUED | OUTPATIENT
Start: 2021-04-19 | End: 2021-04-23

## 2021-04-19 RX ORDER — OXYCODONE HYDROCHLORIDE 5 MG/1
5 TABLET ORAL EVERY 4 HOURS
Refills: 0 | Status: DISCONTINUED | OUTPATIENT
Start: 2021-04-19 | End: 2021-04-21

## 2021-04-19 RX ORDER — ZINC OXIDE 200 MG/G
1 OINTMENT TOPICAL THREE TIMES A DAY
Refills: 0 | Status: DISCONTINUED | OUTPATIENT
Start: 2021-04-19 | End: 2021-04-22

## 2021-04-19 RX ORDER — HEPARIN SODIUM 5000 [USP'U]/ML
7500 INJECTION INTRAVENOUS; SUBCUTANEOUS
Refills: 0 | Status: COMPLETED | OUTPATIENT
Start: 2021-04-19 | End: 2021-04-20

## 2021-04-19 RX ORDER — ONDANSETRON 8 MG/1
8 TABLET, FILM COATED ORAL EVERY 8 HOURS
Refills: 0 | Status: DISCONTINUED | OUTPATIENT
Start: 2021-04-19 | End: 2021-04-19

## 2021-04-19 RX ADMIN — Medication 600 MILLIGRAM(S): at 16:24

## 2021-04-19 RX ADMIN — Medication 1000 MILLIGRAM(S): at 06:07

## 2021-04-19 RX ADMIN — Medication 600 MILLIGRAM(S): at 00:48

## 2021-04-19 RX ADMIN — ONDANSETRON 108 MILLIGRAM(S): 8 TABLET, FILM COATED ORAL at 15:08

## 2021-04-19 RX ADMIN — ZINC OXIDE 1 APPLICATION(S): 200 OINTMENT TOPICAL at 23:18

## 2021-04-19 RX ADMIN — Medication 100 MILLIGRAM(S): at 11:45

## 2021-04-19 RX ADMIN — SODIUM CHLORIDE 3 MILLILITER(S): 9 INJECTION INTRAMUSCULAR; INTRAVENOUS; SUBCUTANEOUS at 16:20

## 2021-04-19 RX ADMIN — OXYCODONE HYDROCHLORIDE 5 MILLIGRAM(S): 5 TABLET ORAL at 21:10

## 2021-04-19 RX ADMIN — Medication 100 MILLIGRAM(S): at 16:25

## 2021-04-19 RX ADMIN — Medication 100 MILLIGRAM(S): at 21:13

## 2021-04-19 RX ADMIN — Medication 100 GRAM(S): at 22:00

## 2021-04-19 RX ADMIN — Medication 100 MILLIGRAM(S): at 21:10

## 2021-04-19 RX ADMIN — HYDROMORPHONE HYDROCHLORIDE 0.5 MILLIGRAM(S): 2 INJECTION INTRAMUSCULAR; INTRAVENOUS; SUBCUTANEOUS at 10:21

## 2021-04-19 RX ADMIN — Medication 100 GRAM(S): at 11:45

## 2021-04-19 RX ADMIN — SODIUM CHLORIDE 3 MILLILITER(S): 9 INJECTION INTRAMUSCULAR; INTRAVENOUS; SUBCUTANEOUS at 21:16

## 2021-04-19 RX ADMIN — Medication 100 MILLIGRAM(S): at 05:11

## 2021-04-19 RX ADMIN — Medication 600 MILLIGRAM(S): at 17:00

## 2021-04-19 RX ADMIN — HYDROMORPHONE HYDROCHLORIDE 0.5 MILLIGRAM(S): 2 INJECTION INTRAMUSCULAR; INTRAVENOUS; SUBCUTANEOUS at 11:00

## 2021-04-19 RX ADMIN — Medication 5 MILLIGRAM(S): at 21:10

## 2021-04-19 RX ADMIN — Medication 1000 MILLIGRAM(S): at 05:07

## 2021-04-19 RX ADMIN — HEPARIN SODIUM 7500 UNIT(S): 5000 INJECTION INTRAVENOUS; SUBCUTANEOUS at 16:26

## 2021-04-19 RX ADMIN — OXYCODONE HYDROCHLORIDE 5 MILLIGRAM(S): 5 TABLET ORAL at 22:00

## 2021-04-19 NOTE — CONSULT NOTE ADULT - ASSESSMENT
Assessment: 35y  w/PMH of morbid obesity presenting for severe abdominal pain, n/v and fevers at home. Patient reports she has been having abdominal pain since the beginning of the month. CT revealed bilateral TOAs. Case reviewed with Dr. Contreras; due to body habitus, would be very difficult to access tubo-ovarian collections. Collections not amenable to drainage at this time. Additionally, CT concerning for detached vs partially detached fibroid. Recommend correlating with MRI.       Communicated with: primary team

## 2021-04-19 NOTE — CONSULT NOTE ADULT - ASSESSMENT
INCOMPLETE    35y  w/PMH of morbid obesity admitted for severe sepsis  INCOMPLETE    35y  w/PMH of morbid obesity admitted for severe sepsis 2/2 PID from bilateral tuboovarian abscesses.     #HTN - patient denies hx of HTN currently not prescribed any home HTN meds. In flowsheets patient sbp ranging from 130s-140s. Patient reports intermittent pain which could be driving high blood pressures.   - currently would not recommend starting anti-hypertensives as patient currently septic, would reassess when PID better controlled    #Severe sepsis - patient p/w severe sepsis 2/2 bilateral tuboovarian abscesses. Blood cx, urine cx currently NGTD. WBC currently downtrending.   - planned drainage with IR  - management as per primary with cefotetan 2g q12 hrs, doxy 100mg q12 hrs and flagyl 500mg q8 hrs  - f/u cultures and narrow as appropriate    #Anemia - patient p/w vaginal bleeding per primary team patient currently on period with report of some hx menorrhagia. Patient currently receiving 2U of PRBCS. Patient has not had any more vaginal bleeding since admission per primary. Etiology likely acute blood loss vs JORDAN vs less likely hemoconcentration?  - heme/onc consulted and recommended iron studies that weren't sent. Patient now getting blood transfusion so iron studies wouldn't be accurate at this time  - trend hgb with hgb goal >7 INCOMPLETE    35y  w/PMH of morbid obesity admitted for severe sepsis 2/2 PID from bilateral tuboovarian abscesses.     #HTN - patient denies hx of HTN currently not prescribed any home HTN meds. In flowsheets patient sbp ranging from 130s-140s. Patient reports intermittent pain which could be driving high blood pressures.   - currently would not recommend starting anti-hypertensives as patient currently septic, would reassess when PID better controlled    #Severe sepsis - patient p/w severe sepsis 2/2 bilateral tuboovarian abscesses. Blood cx, urine cx currently NGTD. WBC currently downtrending.   - planned drainage with IR aborted 2/2 body habitus  - management as per primary with cefotetan 2g q12 hrs, doxy 100mg q12 hrs and flagyl 500mg q8 hrs  - f/u cultures and narrow as appropriate    #Anemia - patient p/w vaginal bleeding per primary team patient currently on period with report of some hx menorrhagia. Patient currently receiving 2U of PRBCS. Patient has not had any more vaginal bleeding since admission per primary. Etiology likely acute blood loss vs JORDAN vs less likely hemoconcentration?  - heme/onc consulted and recommended iron studies that weren't sent. Patient now getting blood transfusion so iron studies wouldn't be accurate at this time  - trend hgb with hgb goal >7

## 2021-04-19 NOTE — CONSULT NOTE ADULT - SUBJECTIVE AND OBJECTIVE BOX
HPI:  35y  w/PMH of morbid obesity presenting for severe abdominal pain, n/v and fevers at home. Patient reports she has been having abdominal pain since the beginning of the month. She initially thought it was due to constipation, took a laxative with minimal relief. She then began to have episodes of vomiting, subjective fevers and chills at home with persistent lower abdominal pain. She denies abnormal vaginal discharge or foul smell. Endorses irregular menses, most recently menses started on 4/3 lasted 7 days which is normal for her, very heavy with passage of clots. She resumed bleeding a few days ago. She reports she intermittently has vaginal bleeding twice a month. She denies history of fibroids, abnormal papsmears, ovarian cysts.   Denies, chest pain, palpitations, SOB. +flatus, +BM    In the ED patient tachycardic to 130s, BP normal to elevated BP. WBC 49, H 8.5, Platelets 1213  d-dimer 1312, lactate 4.4, c-reactive protein. She received IVF, vanc/zosyn with improvement in lactate to 2.4.     OB H/x:  SAB x3    GYN H/x: denies history of fibroids, abnormal papsmears, ovarian cysts, STDs  Last sexually active 3 years ago w/men only  Last saw a gyn 1 year ago, exam wnl per patient   Hx of abnormal menses, irregular    ROS:  Otherwise negative, except as specified in HPI.    PMH:    PSH:    FH:    SH:    ALLERGIES:    MEDICATIONS:  acetaminophen   Tablet .. 1000 milliGRAM(s) Oral every 6 hours PRN  cefoTEtan  IVPB 2 Gram(s) IV Intermittent every 12 hours  cefoTEtan  IVPB      doxycycline hyclate Capsule 100 milliGRAM(s) Oral every 12 hours  ibuprofen  Tablet. 600 milliGRAM(s) Oral every 6 hours PRN  melatonin 5 milliGRAM(s) Oral at bedtime  metroNIDAZOLE  IVPB 500 milliGRAM(s) IV Intermittent every 8 hours  nicotine -   7 mG/24Hr(s) Patch 1 patch Transdermal daily  sodium chloride 0.9% lock flush 3 milliLiter(s) IV Push every 8 hours          VITAL SIGNS:  ICU Vital Signs Last 24 Hrs  T(C): 36.8 (2021 08:51), Max: 38.1 (2021 14:32)  T(F): 98.3 (2021 08:51), Max: 100.5 (2021 14:32)  HR: 126 (2021 09:04) (98 - 134)  BP: 134/91 (2021 09:04) (126/79 - 174/84)  BP(mean): 109 (2021 09:04) (95 - 109)  ABP: --  ABP(mean): --  RR: 18 (2021 09:04) (16 - 20)  SpO2: 98% (2021 09:04) (97% - 100%)    CAPILLARY BLOOD GLUCOSE          PHYSICAL EXAM:  Constitutional: Young female resting comfortably in bed, NAD  HEENT: NC/AT; PERRL, anicteric sclera; no oropharyngeal erythema or exudates; MMM  Neck: supple, no appreciable JVD  Respiratory: CTA B/L, no W/R/R; respirations appear non-labored, conversive in full sentences  Cardiovascular: +S1/S2, RRR  Gastrointestinal: obese, abdomen soft, NT/ND  Extremities: WWP; no clubbing, cyanosis or edema  Vascular: 2+ radial, femoral, and DP/PT pulses B/L  Dermatologic: skin normal color and turgor; no visible rashes  Neurological: aox3    LABS:                        6.8    30.09 )-----------( 956      ( 2021 08:10 )             22.0     04-19    132<L>  |  96  |  15  ----------------------------<  113<H>  3.6   |  24  |  0.95    Ca    8.9      2021 08:10  Phos  3.2     04-19  Mg     1.7     -19    TPro  8.0  /  Alb  3.3  /  TBili  0.2  /  DBili  x   /  AST  15  /  ALT  8<L>  /  AlkPhos  102  04-18    PT/INR - ( 2021 08:10 )   PT: 15.9 sec;   INR: 1.34          PTT - ( 2021 08:10 )  PTT:30.0 sec  Lactate, Blood: 1.9 mmol/L (- @ 08:10)  Lactate, Blood: 2.4 mmol/L (21 @ 17:36)  Lactate, Blood: 4.2 mmol/L (21 @ 15:05)  Lactate, Blood: 4.4 mmol/L (21 @ 14:13)        Urinalysis Basic - ( 2021 20:28 )    Color: Yellow / Appearance: Clear / SG: <=1.005 / pH: x  Gluc: x / Ketone: NEGATIVE  / Bili: Negative / Urobili: 0.2 E.U./dL   Blood: x / Protein: Trace mg/dL / Nitrite: NEGATIVE   Leuk Esterase: NEGATIVE / RBC: < 5 /HPF / WBC > 10 /HPF   Sq Epi: x / Non Sq Epi: 5-10 /HPF / Bacteria: Present /HPF      Blood Gas Profile w/Lytes - Venous: Performed in Lab (21 @ 14:12)      EKG: Reviewed.    RADIOLOGY & ADDITIONAL TESTS: Reviewed.

## 2021-04-19 NOTE — PROGRESS NOTE ADULT - ASSESSMENT
35y  HD2 with PMH of morbid obesity presented for severe abdominal pain, n/v and fevers at home found to be septic secondary to bilateral tuboovarian abscesses. Now afebrile.    1. Neuro: oral pain medications  2. CV: VS per routine  HTN  - Medicine consult this AM  R/O DM  - H A1C this AM  3. Pulm: encourage IS and ambulation  R/O PE/DVT  - Limited study CTA and LE dopplers  4. GI: regular diet  5. : Voiding   6. DVT ppx: encourage ambulation, SCDs, s/p 7500 U SQH last night - held this AM for potential IR procedure   7. ID: Sepsis secondary to bilateral TOAs  - Continue antibiotic treatment with IV cefotetan/flagyl, PO doxy   - Continue maintenance fluids, repeat labs pending this AM including coags, lactate, HIV/RPR (pt consented), TSH/T4, Hg A1C this AM  - f/u UA, UCx, urine G/C   - IR consult this AM for drainage (NPO since MN)  - f/u blood cultures  - f/u vaginitis panel, vaginal culture  Skin infection on thighs/vulva  - Keep area clean and dry  - s/p 1x dose fluconazole  - Consider wound care  8. Heme: heme consult - Leukocytosis, neutrophil and monocyte-predominant, likely inflammatory/reactive, f/u LE dopplers  9. Psych: collateral information from mom states patient has intellectual disability; with irritation on inner thighs will screen for abuse  - SW consult  Dispo: pending clinical improvement

## 2021-04-19 NOTE — CONSULT NOTE ADULT - ATTENDING COMMENTS
Called for recs re: HTN treatment  Currently being treated for severe sepsis-POA due to TOA, clinically improving but still with persistent leukocytosis (trending down) and tachycardic which could also be due to acute blood loss anemia (likely JORDAN) from likely GYN source (menorrhagia), being transfused at time of visit  Recommend to keep Hg above 7, TSH is normal  Most recent BP's have been on acceptable range, given ongoing sepsis will suggest to hold off on starting any anti-hypertensives at this time, patient denies being told she has HTN although her EKG reveals LVH which will be suggestive of long standing HTN. We will continue to follow closely and advise re: starting BP meds if SBP is consistently elevated (or DBP), likely with either amlodipine or a diuretic  Consider getting ID on board for recommendations on Abx treatment for TOA  Hyponatremia possibly SIADH related due to pain (pt looks euvolemic on exam)  Morbid obesity, may have sleep apnea as well  Rest as above Called for recs re: HTN treatment  Currently being treated for severe sepsis-POA due to TOA, clinically improving but still with persistent leukocytosis (trending down) and tachycardic which could also be due to acute blood loss anemia (likely JORDAN) from likely GYN source (menorrhagia), being transfused at time of visit  Recommend to keep Hg above 7, TSH is normal  Most recent BP's have been on acceptable range, given ongoing sepsis will suggest to hold off on starting any anti-hypertensives at this time, patient denies being told she has HTN although her EKG reveals LVH which will be suggestive of long standing HTN. We will continue to follow closely and advise re: starting BP meds if SBP is consistently elevated (or DBP), likely with either amlodipine or a diuretic  Consider getting ID on board for recommendations on Abx treatment for TOA  Hyponatremia possibly SIADH related due to pain (pt looks euvolemic on exam)  A1C of 6.2 --> pre-diabetes, lifestyle modification advised  Morbid obesity, may have sleep apnea as well  Rest as above

## 2021-04-19 NOTE — CONSULT NOTE ADULT - SUBJECTIVE AND OBJECTIVE BOX
35y  w/PMH of morbid obesity presenting for severe abdominal pain, n/v and fevers at home. Patient reports she has been having abdominal pain since the beginning of the month. She initially thought it was due to constipation, took a laxative with minimal relief. She then began to have episodes of vomiting, subjective fevers and chills at home with persistent lower abdominal pain. She denies abnormal vaginal discharge or foul smell. Endorses irregular menses, most recently menses started on 4/3 lasted 7 days which is normal for her, very heavy with passage of clots. She resumed bleeding a few days ago. She reports she intermittently has vaginal bleeding twice a month. She denies history of fibroids, abnormal pap smears, ovarian cysts. IR consulted for bilateral tubo-ovarian abscesses drainage.        Clinical History: SEPSIS DUE TOUNSPECIFIED ORGANISM    Handoff    MEWS Score    Morbid obesity    Sepsis, due to unspecified organism, unspecified whether acute organ dysfunction present    ABDOMINAL PAIN    PID (pelvic inflammatory disease)    TOA (tubo-ovarian abscess)    [email or web address]        Meds:acetaminophen   Tablet .. 1000 milliGRAM(s) Oral every 6 hours PRN  cefoTEtan  IVPB 2 Gram(s) IV Intermittent every 12 hours  cefoTEtan  IVPB      doxycycline hyclate Capsule 100 milliGRAM(s) Oral every 12 hours  heparin   Injectable 7500 Unit(s) SubCutaneous two times a day  ibuprofen  Tablet. 600 milliGRAM(s) Oral every 6 hours PRN  melatonin 5 milliGRAM(s) Oral at bedtime  metroNIDAZOLE  IVPB 500 milliGRAM(s) IV Intermittent every 8 hours  nicotine -   7 mG/24Hr(s) Patch 1 patch Transdermal daily  ondansetron  IVPB 8 milliGRAM(s) IV Intermittent every 8 hours PRN  sodium chloride 0.9% lock flush 3 milliLiter(s) IV Push every 8 hours      Allergies:No Known Allergies        Labs:                           6.8    30.09 )-----------( 956      ( 2021 08:10 )             22.0     PT/INR - ( 2021 08:10 )   PT: 15.9 sec;   INR: 1.34          PTT - ( 2021 08:10 )  PTT:30.0 sec  04-19    132<L>  |  96  |  15  ----------------------------<  113<H>  3.6   |  24  |  0.95    Ca    8.9      2021 08:10  Phos  3.2     -  Mg     1.7         TPro  8.0  /  Alb  3.3  /  TBili  0.2  /  DBili  x   /  AST  15  /  ALT  8<L>  /  AlkPhos  102            Imaging Findings: reviewed  1. Within this limitation there is partially visualized complex fluid in the cul-de-sac and enlarged ovaries with central hypoechogenicity correlating to abscesses visualized on CT performed earlier same day.  2. Multiple uterine fibroids

## 2021-04-19 NOTE — PROVIDER CONTACT NOTE (OTHER) - SITUATION
HR up to 120, other VSS, Pt denies chest pain and SOB, MD Arms Makayla aware, no intervention. will continue monitoring.

## 2021-04-20 LAB
ANION GAP SERPL CALC-SCNC: 11 MMOL/L — SIGNIFICANT CHANGE UP (ref 5–17)
BUN SERPL-MCNC: 9 MG/DL — SIGNIFICANT CHANGE UP (ref 7–23)
C TRACH RRNA SPEC QL NAA+PROBE: SIGNIFICANT CHANGE UP
CALCIUM SERPL-MCNC: 8.7 MG/DL — SIGNIFICANT CHANGE UP (ref 8.4–10.5)
CHLORIDE SERPL-SCNC: 97 MMOL/L — SIGNIFICANT CHANGE UP (ref 96–108)
CO2 SERPL-SCNC: 23 MMOL/L — SIGNIFICANT CHANGE UP (ref 22–31)
CREAT SERPL-MCNC: 0.52 MG/DL — SIGNIFICANT CHANGE UP (ref 0.5–1.3)
CULTURE RESULTS: NO GROWTH — SIGNIFICANT CHANGE UP
GLUCOSE SERPL-MCNC: 109 MG/DL — HIGH (ref 70–99)
HCT VFR BLD CALC: 26.3 % — LOW (ref 34.5–45)
HGB BLD-MCNC: 8.1 G/DL — LOW (ref 11.5–15.5)
MAGNESIUM SERPL-MCNC: 1.8 MG/DL — SIGNIFICANT CHANGE UP (ref 1.6–2.6)
MCHC RBC-ENTMCNC: 22.5 PG — LOW (ref 27–34)
MCHC RBC-ENTMCNC: 30.8 GM/DL — LOW (ref 32–36)
MCV RBC AUTO: 73.1 FL — LOW (ref 80–100)
N GONORRHOEA RRNA SPEC QL NAA+PROBE: SIGNIFICANT CHANGE UP
NRBC # BLD: 0 /100 WBCS — SIGNIFICANT CHANGE UP (ref 0–0)
PHOSPHATE SERPL-MCNC: 3.1 MG/DL — SIGNIFICANT CHANGE UP (ref 2.5–4.5)
PLATELET # BLD AUTO: 719 K/UL — HIGH (ref 150–400)
POTASSIUM SERPL-MCNC: 3.6 MMOL/L — SIGNIFICANT CHANGE UP (ref 3.5–5.3)
POTASSIUM SERPL-SCNC: 3.6 MMOL/L — SIGNIFICANT CHANGE UP (ref 3.5–5.3)
RBC # BLD: 3.6 M/UL — LOW (ref 3.8–5.2)
RBC # FLD: 19.9 % — HIGH (ref 10.3–14.5)
SODIUM SERPL-SCNC: 131 MMOL/L — LOW (ref 135–145)
SPECIMEN SOURCE: SIGNIFICANT CHANGE UP
WBC # BLD: 22.84 K/UL — HIGH (ref 3.8–10.5)
WBC # FLD AUTO: 22.84 K/UL — HIGH (ref 3.8–10.5)

## 2021-04-20 PROCEDURE — 72196 MRI PELVIS W/DYE: CPT | Mod: 26

## 2021-04-20 PROCEDURE — 99233 SBSQ HOSP IP/OBS HIGH 50: CPT | Mod: GC

## 2021-04-20 RX ORDER — POTASSIUM CHLORIDE 20 MEQ
40 PACKET (EA) ORAL ONCE
Refills: 0 | Status: COMPLETED | OUTPATIENT
Start: 2021-04-20 | End: 2021-04-20

## 2021-04-20 RX ORDER — MAGNESIUM SULFATE 500 MG/ML
2 VIAL (ML) INJECTION ONCE
Refills: 0 | Status: COMPLETED | OUTPATIENT
Start: 2021-04-20 | End: 2021-04-20

## 2021-04-20 RX ORDER — POLYETHYLENE GLYCOL 3350 17 G/17G
17 POWDER, FOR SOLUTION ORAL DAILY
Refills: 0 | Status: DISCONTINUED | OUTPATIENT
Start: 2021-04-20 | End: 2021-04-23

## 2021-04-20 RX ORDER — OXYCODONE HYDROCHLORIDE 5 MG/1
10 TABLET ORAL ONCE
Refills: 0 | Status: DISCONTINUED | OUTPATIENT
Start: 2021-04-20 | End: 2021-04-20

## 2021-04-20 RX ORDER — HEPARIN SODIUM 5000 [USP'U]/ML
7500 INJECTION INTRAVENOUS; SUBCUTANEOUS EVERY 8 HOURS
Refills: 0 | Status: DISCONTINUED | OUTPATIENT
Start: 2021-04-20 | End: 2021-04-21

## 2021-04-20 RX ADMIN — Medication 100 GRAM(S): at 09:22

## 2021-04-20 RX ADMIN — HEPARIN SODIUM 7500 UNIT(S): 5000 INJECTION INTRAVENOUS; SUBCUTANEOUS at 05:43

## 2021-04-20 RX ADMIN — Medication 100 MILLIGRAM(S): at 05:41

## 2021-04-20 RX ADMIN — ZINC OXIDE 1 APPLICATION(S): 200 OINTMENT TOPICAL at 11:37

## 2021-04-20 RX ADMIN — Medication 100 MILLIGRAM(S): at 12:53

## 2021-04-20 RX ADMIN — HEPARIN SODIUM 7500 UNIT(S): 5000 INJECTION INTRAVENOUS; SUBCUTANEOUS at 12:53

## 2021-04-20 RX ADMIN — HEPARIN SODIUM 7500 UNIT(S): 5000 INJECTION INTRAVENOUS; SUBCUTANEOUS at 22:26

## 2021-04-20 RX ADMIN — ZINC OXIDE 1 APPLICATION(S): 200 OINTMENT TOPICAL at 05:41

## 2021-04-20 RX ADMIN — OXYCODONE HYDROCHLORIDE 5 MILLIGRAM(S): 5 TABLET ORAL at 15:11

## 2021-04-20 RX ADMIN — Medication 100 MILLIGRAM(S): at 22:27

## 2021-04-20 RX ADMIN — ZINC OXIDE 1 APPLICATION(S): 200 OINTMENT TOPICAL at 22:29

## 2021-04-20 RX ADMIN — Medication 100 MILLIGRAM(S): at 23:11

## 2021-04-20 RX ADMIN — Medication 100 MILLIGRAM(S): at 09:21

## 2021-04-20 RX ADMIN — Medication 5 MILLIGRAM(S): at 22:27

## 2021-04-20 RX ADMIN — SODIUM CHLORIDE 3 MILLILITER(S): 9 INJECTION INTRAMUSCULAR; INTRAVENOUS; SUBCUTANEOUS at 22:28

## 2021-04-20 RX ADMIN — OXYCODONE HYDROCHLORIDE 10 MILLIGRAM(S): 5 TABLET ORAL at 22:26

## 2021-04-20 RX ADMIN — OXYCODONE HYDROCHLORIDE 5 MILLIGRAM(S): 5 TABLET ORAL at 15:33

## 2021-04-20 RX ADMIN — OXYCODONE HYDROCHLORIDE 5 MILLIGRAM(S): 5 TABLET ORAL at 06:15

## 2021-04-20 RX ADMIN — SODIUM CHLORIDE 3 MILLILITER(S): 9 INJECTION INTRAMUSCULAR; INTRAVENOUS; SUBCUTANEOUS at 05:41

## 2021-04-20 RX ADMIN — Medication 5 MILLIGRAM(S): at 22:28

## 2021-04-20 RX ADMIN — Medication 50 GRAM(S): at 10:30

## 2021-04-20 RX ADMIN — OXYCODONE HYDROCHLORIDE 5 MILLIGRAM(S): 5 TABLET ORAL at 05:46

## 2021-04-20 RX ADMIN — SODIUM CHLORIDE 3 MILLILITER(S): 9 INJECTION INTRAMUSCULAR; INTRAVENOUS; SUBCUTANEOUS at 11:37

## 2021-04-20 RX ADMIN — OXYCODONE HYDROCHLORIDE 10 MILLIGRAM(S): 5 TABLET ORAL at 22:50

## 2021-04-20 RX ADMIN — Medication 40 MILLIEQUIVALENT(S): at 09:21

## 2021-04-20 NOTE — PROGRESS NOTE ADULT - ATTENDING COMMENTS
Pt seen and examined with resident. Continue plan for care with extended course of IV antibiotics due to habitus, size of TOAs, increased WBC, etc.   Notes improvement of stomach pain and skin rash.   Anticipate another 24-36 hours of IV antibiotics

## 2021-04-20 NOTE — PROGRESS NOTE ADULT - ASSESSMENT
35y  w/PMH of morbid obesity admitted for severe sepsis 2/2 PID from bilateral tuboovarian abscesses.  35y  w/PMH of morbid obesity admitted for severe sepsis 2/2 PID from bilateral tuboovarian abscesses.     #HTN - patient denies hx of HTN currently not prescribed any home HTN meds. In flowsheets patient sbp ranging from 130s-140s. Patient reports intermittent pain which could be driving high blood pressures.   - currently would not recommend starting anti-hypertensives as patient currently septic, would reassess when PID better controlled    #Severe sepsis - patient p/w severe sepsis 2/2 bilateral tuboovarian abscesses. Blood cx, urine cx currently NGTD. WBC currently downtrending.   - planned drainage with IR aborted 2/2 body habitus  - management as per primary with cefotetan 2g q12 hrs, doxy 100mg q12 hrs and flagyl 500mg q8 hrs  - f/u cultures and narrow as appropriate    #Anemia - patient p/w vaginal bleeding per primary team patient currently on period with report of some hx menorrhagia. Patient currently receiving 2U of PRBCS. Patient has not had any more vaginal bleeding since admission per primary. Etiology likely acute blood loss vs JORDAN vs less likely hemoconcentration?  - heme/onc consulted and recommended iron studies that weren't sent. Patient now getting blood transfusion so iron studies wouldn't be accurate at this time  - trend hgb with hgb goal >7    Will continue to follow with you.

## 2021-04-20 NOTE — PROGRESS NOTE ADULT - ASSESSMENT
5y  HD3 with PMH of morbid obesity presented for severe abdominal pain, n/v and fevers at home found to be septic secondary to bilateral tuboovarian abscesses. Now afebrile.    1. Neuro: oral pain medications  2. CV: VS per routine  HTN  - Medicine consulted and stated NTD while patient is septic, cont to monitor BP   3. Pulm: encourage IS and ambulation  R/O PE/DVT  - Limited study CTA and LE dopplers  4. GI: Regular diet  5. : Voiding   6. DVT ppx: encourage ambulation, SCDs, 7500 U SQH BID  7. ID: Sepsis secondary to bilateral TOAs  - Continue antibiotic treatment with IV cefotetan/flagyl, PO doxy   - Continue maintenance fluids, repeat labs pending this AM   - f/u UA, UCx, urine G/C   - IR consulted  and unable to drain 2/2 position; recommended MRI (ordered for )  - f/u blood cultures  - f/u vaginitis panel, vaginal culture  Skin infection on thighs/vulva  - Keep area clean and dry  - s/p 1x dose fluconazole  - Wound care recommended silver nitrite and weinstein; no weinstein at this time  8. Heme: heme consult - Leukocytosis, neutrophil and monocyte-predominant, likely inflammatory/reactive; anemia s/p 2 u prbc with minimal vaginal bleeding at this time  9. Psych: collateral information from mom states patient has intellectual disability  -  consult  Dispo: pending clinical improvement     Will ask attending physician to see patient today   35y  HD3 with PMH of morbid obesity presented for severe abdominal pain, n/v and fevers at home found to be septic secondary to bilateral tuboovarian abscesses. Now afebrile.    1. Neuro: oral pain medications  2. CV: VS per routine  HTN  - Medicine consulted and stated NTD while patient is septic, cont to monitor BP   3. Pulm: encourage IS and ambulation  R/O PE/DVT  - Limited study CTA and LE dopplers  4. GI: Regular diet  5. : Voiding   6. DVT ppx: encourage ambulation, SCDs, 7500 U SQH BID  7. ID: Sepsis secondary to bilateral TOAs  - Continue antibiotic treatment with IV cefotetan/flagyl, PO doxy   - Continue maintenance fluids, repeat labs pending this AM   - f/u UA, UCx, urine G/C   - IR consulted  and unable to drain 2/2 position; recommended MRI (ordered for )  - f/u blood cultures  - f/u vaginitis panel, vaginal culture  Skin infection on thighs/vulva  - Keep area clean and dry  - s/p 1x dose fluconazole  - Wound care recommended silver nitrite and weinstein; no weinstein at this time  8. Heme: heme consult - Leukocytosis, neutrophil and monocyte-predominant, likely inflammatory/reactive; anemia s/p 2 u prbc with minimal vaginal bleeding at this time  9. Psych: collateral information from mom states patient has intellectual disability  - SW consult  Dispo: pending clinical improvement

## 2021-04-20 NOTE — PROGRESS NOTE ADULT - ATTENDING COMMENTS
Clinically improving, c/w present care, consider getting ID on board for further Abx recs  F/up pelvic MRI  Leukocytosis improving  Tachycardia improved after transfusion  Hyponatremia likely SIADH related (d/t pain), c/w pain control and optimize bowel regimen as no BM in past 2-3 days and on opiates  BP acceptable at this time, will continue to follow up along and reassess need to start anti-hypertensives  Rest as above Clinically improving, c/w present care, consider getting ID on board for further Abx recs  F/up pelvic MRI  Leukocytosis improving, thrombocytosis likely reactive  S/p 2U PRBC with suboptimal response but stable as of now, keep Hg above 7  Tachycardia improved after transfusion  Hyponatremia likely SIADH related (d/t pain), c/w pain control and optimize bowel regimen as no BM in past 2-3 days and on opiates  BP acceptable at this time, will continue to follow up along and reassess need to start anti-hypertensives  Rest as above

## 2021-04-21 LAB
ALBUMIN SERPL ELPH-MCNC: 2.4 G/DL — LOW (ref 3.3–5)
ALP SERPL-CCNC: 77 U/L — SIGNIFICANT CHANGE UP (ref 40–120)
ALT FLD-CCNC: <5 U/L — LOW (ref 10–45)
ANION GAP SERPL CALC-SCNC: 10 MMOL/L — SIGNIFICANT CHANGE UP (ref 5–17)
ANISOCYTOSIS BLD QL: SLIGHT — SIGNIFICANT CHANGE UP
AST SERPL-CCNC: 8 U/L — LOW (ref 10–40)
BASOPHILS # BLD AUTO: 0 K/UL — SIGNIFICANT CHANGE UP (ref 0–0.2)
BASOPHILS NFR BLD AUTO: 0 % — SIGNIFICANT CHANGE UP (ref 0–2)
BILIRUB SERPL-MCNC: 0.2 MG/DL — SIGNIFICANT CHANGE UP (ref 0.2–1.2)
BUN SERPL-MCNC: 6 MG/DL — LOW (ref 7–23)
CALCIUM SERPL-MCNC: 8.5 MG/DL — SIGNIFICANT CHANGE UP (ref 8.4–10.5)
CHLORIDE SERPL-SCNC: 98 MMOL/L — SIGNIFICANT CHANGE UP (ref 96–108)
CO2 SERPL-SCNC: 25 MMOL/L — SIGNIFICANT CHANGE UP (ref 22–31)
CREAT SERPL-MCNC: 0.51 MG/DL — SIGNIFICANT CHANGE UP (ref 0.5–1.3)
CULTURE RESULTS: SIGNIFICANT CHANGE UP
EOSINOPHIL # BLD AUTO: 0.14 K/UL — SIGNIFICANT CHANGE UP (ref 0–0.5)
EOSINOPHIL NFR BLD AUTO: 0.9 % — SIGNIFICANT CHANGE UP (ref 0–6)
GIANT PLATELETS BLD QL SMEAR: PRESENT — SIGNIFICANT CHANGE UP
GLUCOSE SERPL-MCNC: 110 MG/DL — HIGH (ref 70–99)
HCT VFR BLD CALC: 24.3 % — LOW (ref 34.5–45)
HGB BLD-MCNC: 7.7 G/DL — LOW (ref 11.5–15.5)
HYPOCHROMIA BLD QL: SLIGHT — SIGNIFICANT CHANGE UP
LYMPHOCYTES # BLD AUTO: 1.42 K/UL — SIGNIFICANT CHANGE UP (ref 1–3.3)
LYMPHOCYTES # BLD AUTO: 9 % — LOW (ref 13–44)
MAGNESIUM SERPL-MCNC: 2 MG/DL — SIGNIFICANT CHANGE UP (ref 1.6–2.6)
MANUAL SMEAR VERIFICATION: SIGNIFICANT CHANGE UP
MCHC RBC-ENTMCNC: 22.6 PG — LOW (ref 27–34)
MCHC RBC-ENTMCNC: 31.7 GM/DL — LOW (ref 32–36)
MCV RBC AUTO: 71.5 FL — LOW (ref 80–100)
MICROCYTES BLD QL: SLIGHT — SIGNIFICANT CHANGE UP
MONOCYTES # BLD AUTO: 0.85 K/UL — SIGNIFICANT CHANGE UP (ref 0–0.9)
MONOCYTES NFR BLD AUTO: 5.4 % — SIGNIFICANT CHANGE UP (ref 2–14)
NEUTROPHILS # BLD AUTO: 13.33 K/UL — HIGH (ref 1.8–7.4)
NEUTROPHILS NFR BLD AUTO: 84.7 % — HIGH (ref 43–77)
OVALOCYTES BLD QL SMEAR: SLIGHT — SIGNIFICANT CHANGE UP
PHOSPHATE SERPL-MCNC: 3.2 MG/DL — SIGNIFICANT CHANGE UP (ref 2.5–4.5)
PLAT MORPH BLD: SIGNIFICANT CHANGE UP
PLATELET # BLD AUTO: 682 K/UL — HIGH (ref 150–400)
POLYCHROMASIA BLD QL SMEAR: SLIGHT — SIGNIFICANT CHANGE UP
POTASSIUM SERPL-MCNC: 4 MMOL/L — SIGNIFICANT CHANGE UP (ref 3.5–5.3)
POTASSIUM SERPL-SCNC: 4 MMOL/L — SIGNIFICANT CHANGE UP (ref 3.5–5.3)
PROT SERPL-MCNC: 6.6 G/DL — SIGNIFICANT CHANGE UP (ref 6–8.3)
RBC # BLD: 3.4 M/UL — LOW (ref 3.8–5.2)
RBC # FLD: 21.3 % — HIGH (ref 10.3–14.5)
RBC BLD AUTO: ABNORMAL
SCHISTOCYTES BLD QL AUTO: SLIGHT — SIGNIFICANT CHANGE UP
SODIUM SERPL-SCNC: 133 MMOL/L — LOW (ref 135–145)
SPECIMEN SOURCE: SIGNIFICANT CHANGE UP
TARGETS BLD QL SMEAR: SLIGHT — SIGNIFICANT CHANGE UP
WBC # BLD: 15.74 K/UL — HIGH (ref 3.8–10.5)
WBC # FLD AUTO: 15.74 K/UL — HIGH (ref 3.8–10.5)

## 2021-04-21 PROCEDURE — 99233 SBSQ HOSP IP/OBS HIGH 50: CPT | Mod: GC

## 2021-04-21 RX ORDER — OXYCODONE HYDROCHLORIDE 5 MG/1
5 TABLET ORAL EVERY 4 HOURS
Refills: 0 | Status: DISCONTINUED | OUTPATIENT
Start: 2021-04-21 | End: 2021-04-23

## 2021-04-21 RX ORDER — SODIUM CHLORIDE 9 MG/ML
1000 INJECTION INTRAMUSCULAR; INTRAVENOUS; SUBCUTANEOUS
Refills: 0 | Status: DISCONTINUED | OUTPATIENT
Start: 2021-04-21 | End: 2021-04-21

## 2021-04-21 RX ORDER — OXYCODONE HYDROCHLORIDE 5 MG/1
10 TABLET ORAL EVERY 6 HOURS
Refills: 0 | Status: DISCONTINUED | OUTPATIENT
Start: 2021-04-21 | End: 2021-04-22

## 2021-04-21 RX ORDER — SENNA PLUS 8.6 MG/1
2 TABLET ORAL AT BEDTIME
Refills: 0 | Status: DISCONTINUED | OUTPATIENT
Start: 2021-04-21 | End: 2021-04-23

## 2021-04-21 RX ORDER — IBUPROFEN 200 MG
600 TABLET ORAL EVERY 6 HOURS
Refills: 0 | Status: DISCONTINUED | OUTPATIENT
Start: 2021-04-21 | End: 2021-04-23

## 2021-04-21 RX ORDER — HEPARIN SODIUM 5000 [USP'U]/ML
7500 INJECTION INTRAVENOUS; SUBCUTANEOUS EVERY 8 HOURS
Refills: 0 | Status: DISCONTINUED | OUTPATIENT
Start: 2021-04-21 | End: 2021-04-23

## 2021-04-21 RX ADMIN — Medication 100 MILLIGRAM(S): at 21:50

## 2021-04-21 RX ADMIN — HEPARIN SODIUM 7500 UNIT(S): 5000 INJECTION INTRAVENOUS; SUBCUTANEOUS at 05:21

## 2021-04-21 RX ADMIN — SODIUM CHLORIDE 3 MILLILITER(S): 9 INJECTION INTRAMUSCULAR; INTRAVENOUS; SUBCUTANEOUS at 04:35

## 2021-04-21 RX ADMIN — Medication 100 GRAM(S): at 23:44

## 2021-04-21 RX ADMIN — ZINC OXIDE 1 APPLICATION(S): 200 OINTMENT TOPICAL at 14:03

## 2021-04-21 RX ADMIN — OXYCODONE HYDROCHLORIDE 5 MILLIGRAM(S): 5 TABLET ORAL at 03:21

## 2021-04-21 RX ADMIN — Medication 1 PATCH: at 15:36

## 2021-04-21 RX ADMIN — Medication 100 MILLIGRAM(S): at 09:17

## 2021-04-21 RX ADMIN — Medication 600 MILLIGRAM(S): at 11:46

## 2021-04-21 RX ADMIN — Medication 100 MILLIGRAM(S): at 05:21

## 2021-04-21 RX ADMIN — Medication 5 MILLIGRAM(S): at 21:50

## 2021-04-21 RX ADMIN — Medication 600 MILLIGRAM(S): at 16:31

## 2021-04-21 RX ADMIN — ZINC OXIDE 1 APPLICATION(S): 200 OINTMENT TOPICAL at 05:33

## 2021-04-21 RX ADMIN — OXYCODONE HYDROCHLORIDE 5 MILLIGRAM(S): 5 TABLET ORAL at 15:14

## 2021-04-21 RX ADMIN — OXYCODONE HYDROCHLORIDE 5 MILLIGRAM(S): 5 TABLET ORAL at 02:59

## 2021-04-21 RX ADMIN — SODIUM CHLORIDE 125 MILLILITER(S): 9 INJECTION INTRAMUSCULAR; INTRAVENOUS; SUBCUTANEOUS at 09:20

## 2021-04-21 RX ADMIN — ZINC OXIDE 1 APPLICATION(S): 200 OINTMENT TOPICAL at 21:53

## 2021-04-21 RX ADMIN — Medication 1 PATCH: at 09:17

## 2021-04-21 RX ADMIN — Medication 100 GRAM(S): at 00:00

## 2021-04-21 RX ADMIN — Medication 10 MILLIGRAM(S): at 21:50

## 2021-04-21 RX ADMIN — SENNA PLUS 2 TABLET(S): 8.6 TABLET ORAL at 21:50

## 2021-04-21 RX ADMIN — HEPARIN SODIUM 7500 UNIT(S): 5000 INJECTION INTRAVENOUS; SUBCUTANEOUS at 21:50

## 2021-04-21 RX ADMIN — Medication 1000 MILLIGRAM(S): at 02:59

## 2021-04-21 RX ADMIN — HEPARIN SODIUM 7500 UNIT(S): 5000 INJECTION INTRAVENOUS; SUBCUTANEOUS at 14:03

## 2021-04-21 RX ADMIN — SODIUM CHLORIDE 3 MILLILITER(S): 9 INJECTION INTRAMUSCULAR; INTRAVENOUS; SUBCUTANEOUS at 22:18

## 2021-04-21 RX ADMIN — Medication 100 GRAM(S): at 11:46

## 2021-04-21 RX ADMIN — Medication 100 MILLIGRAM(S): at 14:03

## 2021-04-21 RX ADMIN — SODIUM CHLORIDE 3 MILLILITER(S): 9 INJECTION INTRAMUSCULAR; INTRAVENOUS; SUBCUTANEOUS at 09:29

## 2021-04-21 RX ADMIN — Medication 1000 MILLIGRAM(S): at 03:21

## 2021-04-21 NOTE — PROGRESS NOTE ADULT - ASSESSMENT
35y  HD4 with PMH of morbid obesity presented for severe abdominal pain, n/v and fevers at home found to be septic secondary to bilateral tuboovarian abscesses. Now afebrile.    1. Neuro: oral pain medications  2. CV: VS per routine  HTN  - Medicine consulted and stated NTD while patient is septic, cont to monitor BP   3. Pulm: encourage IS and ambulation  R/O PE/DVT  - Limited study CTA and LE dopplers  4. GI: Regular diet  5. : Weinstein placed  to stay in place until able to ambulate without severe pain to bathroom  6. DVT ppx: encourage ambulation, SCDs, 7500 U SQH BID  7. ID: Sepsis secondary to bilateral TOAs  - Continue antibiotic treatment with IV cefotetan/flagyl, PO doxy   - Continue maintenance fluids, repeat labs pending this AM   - IR consulted  and unable to drain 2/2 position; recommended MRI   - Prelim MRI concern for peritonitis (will f/u final read and discuss plan with attending MD)  - f/u blood cultures  - f/u vaginitis panel, vaginal culture  Skin infection on thighs/vulva  - Keep area clean and dry  - s/p 1x dose fluconazole  - Wound care recommended silver nitrite and weinstein - now in place  8. Heme: heme consult - Leukocytosis, neutrophil and monocyte-predominant, likely inflammatory/reactive; anemia s/p 2 u prbc with minimal vaginal bleeding at this time  9. Psych: collateral information from mom states patient has intellectual disability  - SW consult states no need at this time but will cont to follow  Dispo: pending clinical improvement

## 2021-04-21 NOTE — PROVIDER CONTACT NOTE (OTHER) - ASSESSMENT
pt got slight tachy up to 120s upon exertion with physical therapy. pt ambulated short distance only out of room with PT then back into room. pt denies pain. pt did receive pain meds prior to PT and pt stated feeling fine upon sitting down. pt stable upon return to chair, and dionna tejeda md aware to continue to monitor patient

## 2021-04-21 NOTE — PHYSICAL THERAPY INITIAL EVALUATION ADULT - IMPAIRED TRANSFERS: SIT/STAND, REHAB EVAL
Pt unexpectedly lost balance and denied dizziness as cause. She reported "I am not sure why I lost balance"/impaired balance/impaired postural control

## 2021-04-21 NOTE — PROGRESS NOTE ADULT - ATTENDING COMMENTS
Looking better today, pain has improved, but has not had a BM yet, bowel regimen being optimized (dose of dulcolax has been increased and senna was added today), encourage increased ambulation and PO fluid intake  BP seems acceptable at this time, no need to start any anti-hypertensives, can follow up with PMD as outpatient for reassessment of BP and address need for anti-hypertensives  Leukocytosis continues to improve, c/w Abx for TOA, consider ID consultation for guidance on Abx duration/selection  Thrombocytosis is better as well  Pelvic MRI results reviewed-further plans per GYN  Anemia overall stable since transfusion, keep Hg above 7  D/w patient the importance of following with her PMD upon discharge from the hospital  We will sign off at this time and please reconsult as needed

## 2021-04-21 NOTE — PHYSICAL THERAPY INITIAL EVALUATION ADULT - PERTINENT HX OF CURRENT PROBLEM, REHAB EVAL
35y.o F w/PMH of morbid obesity presenting for severe abdominal pain, n/v and fevers at home. Patient reports she has been having abdominal pain since the beginning of the month. She initially thought it was due to constipation, took a laxative with minimal relief. She then began to have episodes of vomiting, subjective fevers and chills at home with persistent lower abdominal pain.

## 2021-04-21 NOTE — PROGRESS NOTE ADULT - ASSESSMENT
35y  w/PMH of morbid obesity admitted for severe sepsis 2/2 PID from bilateral tuboovarian abscesses.     #HTN - patient denies hx of HTN currently not prescribed any home HTN meds. In flowsheets patient sbp ranging from 130s-140s. Patient reports intermittent pain which could be driving high blood pressures.   - currently would not recommend starting anti-hypertensives as patient currently septic, would reassess when PID better controlled    #Severe sepsis - patient p/w severe sepsis 2/2 bilateral tuboovarian abscesses. Blood cx, urine cx currently NGTD. WBC currently downtrending.   - planned drainage with IR aborted 2/2 body habitus  - management as per primary with cefotetan 2g q12 hrs, doxy 100mg q12 hrs and flagyl 500mg q8 hrs  - f/u cultures and narrow as appropriate    #Anemia - patient p/w vaginal bleeding per primary team patient currently on period with report of some hx menorrhagia. Patient currently receiving 2U of PRBCS. Patient has not had any more vaginal bleeding since admission per primary. Etiology likely acute blood loss vs JORDAN vs less likely hemoconcentration?  - heme/onc consulted and recommended iron studies that weren't sent. Patient now getting blood transfusion so iron studies wouldn't be accurate at this time  - trend hgb with hgb goal >7    Medicine to sign off, please reconsult with questions.

## 2021-04-22 LAB
ANION GAP SERPL CALC-SCNC: 10 MMOL/L — SIGNIFICANT CHANGE UP (ref 5–17)
ANISOCYTOSIS BLD QL: SLIGHT — SIGNIFICANT CHANGE UP
BASOPHILS # BLD AUTO: 0.1 K/UL — SIGNIFICANT CHANGE UP (ref 0–0.2)
BASOPHILS NFR BLD AUTO: 0.9 % — SIGNIFICANT CHANGE UP (ref 0–2)
BUN SERPL-MCNC: 8 MG/DL — SIGNIFICANT CHANGE UP (ref 7–23)
CALCIUM SERPL-MCNC: 8.8 MG/DL — SIGNIFICANT CHANGE UP (ref 8.4–10.5)
CHLORIDE SERPL-SCNC: 96 MMOL/L — SIGNIFICANT CHANGE UP (ref 96–108)
CO2 SERPL-SCNC: 28 MMOL/L — SIGNIFICANT CHANGE UP (ref 22–31)
CREAT SERPL-MCNC: 0.48 MG/DL — LOW (ref 0.5–1.3)
EOSINOPHIL # BLD AUTO: 0.1 K/UL — SIGNIFICANT CHANGE UP (ref 0–0.5)
EOSINOPHIL NFR BLD AUTO: 0.9 % — SIGNIFICANT CHANGE UP (ref 0–6)
GIANT PLATELETS BLD QL SMEAR: PRESENT — SIGNIFICANT CHANGE UP
GLUCOSE SERPL-MCNC: 103 MG/DL — HIGH (ref 70–99)
HCT VFR BLD CALC: 26.7 % — LOW (ref 34.5–45)
HGB BLD-MCNC: 8.3 G/DL — LOW (ref 11.5–15.5)
LYMPHOCYTES # BLD AUTO: 1.74 K/UL — SIGNIFICANT CHANGE UP (ref 1–3.3)
LYMPHOCYTES # BLD AUTO: 15 % — SIGNIFICANT CHANGE UP (ref 13–44)
MACROCYTES BLD QL: SLIGHT — SIGNIFICANT CHANGE UP
MAGNESIUM SERPL-MCNC: 1.9 MG/DL — SIGNIFICANT CHANGE UP (ref 1.6–2.6)
MANUAL SMEAR VERIFICATION: SIGNIFICANT CHANGE UP
MCHC RBC-ENTMCNC: 22.9 PG — LOW (ref 27–34)
MCHC RBC-ENTMCNC: 31.1 GM/DL — LOW (ref 32–36)
MCV RBC AUTO: 73.8 FL — LOW (ref 80–100)
MONOCYTES # BLD AUTO: 1.23 K/UL — HIGH (ref 0–0.9)
MONOCYTES NFR BLD AUTO: 10.6 % — SIGNIFICANT CHANGE UP (ref 2–14)
MYELOCYTES NFR BLD: 2.7 % — HIGH (ref 0–0)
NEUTROPHILS # BLD AUTO: 8.11 K/UL — HIGH (ref 1.8–7.4)
NEUTROPHILS NFR BLD AUTO: 69.9 % — SIGNIFICANT CHANGE UP (ref 43–77)
OVALOCYTES BLD QL SMEAR: SLIGHT — SIGNIFICANT CHANGE UP
PHOSPHATE SERPL-MCNC: 3.2 MG/DL — SIGNIFICANT CHANGE UP (ref 2.5–4.5)
PLAT MORPH BLD: ABNORMAL
PLATELET # BLD AUTO: 712 K/UL — HIGH (ref 150–400)
POTASSIUM SERPL-MCNC: 4.2 MMOL/L — SIGNIFICANT CHANGE UP (ref 3.5–5.3)
POTASSIUM SERPL-SCNC: 4.2 MMOL/L — SIGNIFICANT CHANGE UP (ref 3.5–5.3)
RBC # BLD: 3.62 M/UL — LOW (ref 3.8–5.2)
RBC # FLD: 21 % — HIGH (ref 10.3–14.5)
RBC BLD AUTO: ABNORMAL
SCHISTOCYTES BLD QL AUTO: SLIGHT — SIGNIFICANT CHANGE UP
SODIUM SERPL-SCNC: 134 MMOL/L — LOW (ref 135–145)
TARGETS BLD QL SMEAR: SLIGHT — SIGNIFICANT CHANGE UP
WBC # BLD: 11.6 K/UL — HIGH (ref 3.8–10.5)
WBC # FLD AUTO: 11.6 K/UL — HIGH (ref 3.8–10.5)

## 2021-04-22 RX ORDER — IBUPROFEN 200 MG
1 TABLET ORAL
Qty: 0 | Refills: 0 | DISCHARGE
Start: 2021-04-22

## 2021-04-22 RX ORDER — ACETAMINOPHEN 500 MG
2 TABLET ORAL
Qty: 0 | Refills: 0 | DISCHARGE
Start: 2021-04-22

## 2021-04-22 RX ORDER — PANTOPRAZOLE SODIUM 20 MG/1
40 TABLET, DELAYED RELEASE ORAL
Refills: 0 | Status: DISCONTINUED | OUTPATIENT
Start: 2021-04-22 | End: 2021-04-23

## 2021-04-22 RX ORDER — METRONIDAZOLE 500 MG
500 TABLET ORAL EVERY 8 HOURS
Refills: 0 | Status: DISCONTINUED | OUTPATIENT
Start: 2021-04-22 | End: 2021-04-23

## 2021-04-22 RX ORDER — MAGNESIUM SULFATE 500 MG/ML
2 VIAL (ML) INJECTION ONCE
Refills: 0 | Status: COMPLETED | OUTPATIENT
Start: 2021-04-22 | End: 2021-04-22

## 2021-04-22 RX ADMIN — SODIUM CHLORIDE 3 MILLILITER(S): 9 INJECTION INTRAMUSCULAR; INTRAVENOUS; SUBCUTANEOUS at 14:00

## 2021-04-22 RX ADMIN — Medication 450 MILLIGRAM(S): at 12:58

## 2021-04-22 RX ADMIN — Medication 1000 MILLIGRAM(S): at 03:01

## 2021-04-22 RX ADMIN — SODIUM CHLORIDE 3 MILLILITER(S): 9 INJECTION INTRAMUSCULAR; INTRAVENOUS; SUBCUTANEOUS at 22:15

## 2021-04-22 RX ADMIN — Medication 450 MILLIGRAM(S): at 23:02

## 2021-04-22 RX ADMIN — Medication 50 GRAM(S): at 10:48

## 2021-04-22 RX ADMIN — Medication 600 MILLIGRAM(S): at 06:36

## 2021-04-22 RX ADMIN — Medication 1 PATCH: at 19:40

## 2021-04-22 RX ADMIN — Medication 500 MILLIGRAM(S): at 22:13

## 2021-04-22 RX ADMIN — Medication 600 MILLIGRAM(S): at 18:52

## 2021-04-22 RX ADMIN — Medication 600 MILLIGRAM(S): at 06:03

## 2021-04-22 RX ADMIN — Medication 600 MILLIGRAM(S): at 13:30

## 2021-04-22 RX ADMIN — SODIUM CHLORIDE 3 MILLILITER(S): 9 INJECTION INTRAMUSCULAR; INTRAVENOUS; SUBCUTANEOUS at 06:03

## 2021-04-22 RX ADMIN — Medication 600 MILLIGRAM(S): at 00:45

## 2021-04-22 RX ADMIN — Medication 10 MILLIGRAM(S): at 22:15

## 2021-04-22 RX ADMIN — Medication 1 PATCH: at 12:58

## 2021-04-22 RX ADMIN — ZINC OXIDE 1 APPLICATION(S): 200 OINTMENT TOPICAL at 06:04

## 2021-04-22 RX ADMIN — Medication 100 MILLIGRAM(S): at 06:03

## 2021-04-22 RX ADMIN — Medication 600 MILLIGRAM(S): at 12:58

## 2021-04-22 RX ADMIN — HEPARIN SODIUM 7500 UNIT(S): 5000 INJECTION INTRAVENOUS; SUBCUTANEOUS at 22:14

## 2021-04-22 RX ADMIN — Medication 500 MILLIGRAM(S): at 15:53

## 2021-04-22 RX ADMIN — Medication 600 MILLIGRAM(S): at 23:02

## 2021-04-22 RX ADMIN — Medication 100 MILLIGRAM(S): at 10:48

## 2021-04-22 RX ADMIN — Medication 600 MILLIGRAM(S): at 01:45

## 2021-04-22 RX ADMIN — SENNA PLUS 2 TABLET(S): 8.6 TABLET ORAL at 22:15

## 2021-04-22 RX ADMIN — Medication 450 MILLIGRAM(S): at 18:52

## 2021-04-22 RX ADMIN — HEPARIN SODIUM 7500 UNIT(S): 5000 INJECTION INTRAVENOUS; SUBCUTANEOUS at 15:53

## 2021-04-22 RX ADMIN — HEPARIN SODIUM 7500 UNIT(S): 5000 INJECTION INTRAVENOUS; SUBCUTANEOUS at 06:04

## 2021-04-22 RX ADMIN — Medication 1 PATCH: at 10:49

## 2021-04-22 RX ADMIN — Medication 100 MILLIGRAM(S): at 22:12

## 2021-04-22 RX ADMIN — PANTOPRAZOLE SODIUM 40 MILLIGRAM(S): 20 TABLET, DELAYED RELEASE ORAL at 12:58

## 2021-04-22 RX ADMIN — Medication 1 PATCH: at 06:15

## 2021-04-22 RX ADMIN — POLYETHYLENE GLYCOL 3350 17 GRAM(S): 17 POWDER, FOR SOLUTION ORAL at 12:58

## 2021-04-22 RX ADMIN — Medication 5 MILLIGRAM(S): at 22:16

## 2021-04-22 NOTE — ADVANCED PRACTICE NURSE CONSULT - ASSESSMENT
Patient reports improvement of perianal and intergluteal cleft MASD with partial thickness skin erosion. Areas of erosion are still present but do appear less red and there is less erythema to periwound.    Inner thighs remain painful and with full-thickness skin erosion Right worse than Left thigh. wound beds are bleeding and moist, irregular borders consistent with excessive moisture and friction. Patient reports that she was able to ambulate and move more comfortably with weinstein catheter indwelling. Weinstein remove this morning.     Given improvement of perirectal and intergluteal cleft with use of Calmoseptine, we recommend changing order from InterDry to inguinal/groin area and use Calmoseptine here as well. Orders discussed with Dr. Linares and ZEENAT Salinas.
Patient awake and cooperative for assessment; patient states she developed skin breakdown about 4 days ago. Describes severe burning, stinging pain throughout the day and particularly when eroded areas come in contact with urine or stool.    Skin to perirectal area and bilateral inner buttocks/intergluteal cleft with full thickness skin erosion secondary to excessive moisture and friction. Areas are moist, erythematous, and have small white papules as satellite lesions and overlaying wound bed; consistent with fungal infection. drainage is serosanguineous, small in amount, and has no odor.    To inguinal folds patient as more areas of erosion secondary to moisture/friction. Right groin appears more superficial and dry to touch. Left groin wounds are moist, draining scant serosanguineous drainage without odor.     InterDry placed to inguinal folds. Triad was placed to perirectal region but patient developed burning pain after application. Triad removed and Calmoseptine placed. Patient verbalized increased comfort.

## 2021-04-22 NOTE — DIETITIAN INITIAL EVALUATION ADULT. - NAME AND PHONE
1. Cont. regular diet, consider CST CHO diet if altered BG levels 2. Ensure Max Protein BID (300 kcal, 90 g protein, 900 ml free H2O) 3. Monitor PO intake 4. Trend wts 5. Monitor BM, BM regimen per team 6. Trend labs: BMP, BG q6hrs, lytes, replete PRN

## 2021-04-22 NOTE — DISCHARGE NOTE PROVIDER - NSDCCONDITION_GEN_ALL_CORE
Stable The patient is asked to make an attempt to improve diet and exercise patterns to aid in medical management of this problem.

## 2021-04-22 NOTE — DISCHARGE NOTE PROVIDER - NSDCFUADDINST_GEN_ALL_CORE_FT
Pelvic rest (nothing in vagina) x6 weeks or unless otherwise instructed by physician. Schedule follow up appointment with Dr. Vines in 1-2 weeks. Go to nearest ED if fever >100.4 F, severe abdominal pain or heavy vaginal bleeding.   Wound care: Showering is allowed, no baths. Do not scrub incision area. Pat and dry all areas where incisions are.   Take Motrin 600mg every 6 hours or Tylenol 1000mg every 8 hours as needed for pain  Pelvic rest (nothing in vagina) x6 weeks or unless otherwise instructed by physician. Schedule follow up appointment with Dr. Vines in 1 weeks. Go to nearest ED if fever >100.4 F, severe abdominal pain or heavy vaginal bleeding.   --Tuboovarian Abscesses Treatment: Please take your antibiotics as directed from [facility].  Doxycycline 100 2 times a day for 8 days,   Flagyl 500 every 8 hours for 8 days, Clindamycin 450mg 4 times per day for 8 days. (these will all be a total of 14 days, since she had 6 days of antibiotics complete in the hospital)  --Wound care: To perianal/inner gluteal cleft and inner thighs/ inguinal areas: cleanse gently with bath wipes by patting off soiled barrier. Apply Zinc Oxide to all denuded skin and periwound skin. Apply BID and PRN after episode of incontinence and per patient request.  -- Pain Control: Take Motrin 600mg every 6 hours or Tylenol 1000mg every 8 hours as needed for pain

## 2021-04-22 NOTE — ADVANCED PRACTICE NURSE CONSULT - REASON FOR CONSULT
Severe MASD with erosion to intergluteal cleft/perirectal area/bilateral inguinal/groin folds    -35y  w/PMH of morbid obesity presenting for severe abdominal pain, n/v and fevers at home. Patient reports she has been having abdominal pain since the beginning of the month. She initially thought it was due to constipation, took a laxative with minimal relief. She then began to have episodes of vomiting, subjective fevers and chills at home with persistent lower abdominal pain. She denies abnormal vaginal discharge or foul smell. Endorses irregular menses, most recently menses started on 4/3 lasted 7 days which is normal for her, very heavy with passage of clots. She resumed bleeding a few days ago. She reports she intermittently has vaginal bleeding twice a month. She denies history of fibroids, abnormal papsmears, ovarian cysts.   Denies, chest pain, palpitations, SOB. +flatus, +BM  
Follow up assessment for severe MASD with erosion

## 2021-04-22 NOTE — DISCHARGE NOTE PROVIDER - NSDCMRMEDTOKEN_GEN_ALL_CORE_FT
acetaminophen 500 mg oral tablet: 2 tab(s) orally every 6 hours, As needed, Mild Pain (1 - 3)  ibuprofen 600 mg oral tablet: 1 tab(s) orally every 6 hours   acetaminophen 500 mg oral tablet: 2 tab(s) orally every 6 hours, As needed, Mild Pain (1 - 3)  clindamycin 150 mg oral capsule: 3 cap(s) orally 4 times a day  doxycycline monohydrate 100 mg oral capsule: 1 cap(s) orally every 12 hours  ibuprofen 600 mg oral tablet: 1 tab(s) orally every 6 hours  metroNIDAZOLE 500 mg oral tablet: 1 tab(s) orally every 8 hours  zinc oxide topical cream: Apply topically to affected area 3 times a day -for rash

## 2021-04-22 NOTE — DIETITIAN INITIAL EVALUATION ADULT. - OTHER INFO
35y  w/PMH of morbid obesity presenting for severe abdominal pain, n/v and fevers at home. Patient reports she has been having abdominal pain since the beginning of the month. She initially thought it was due to constipation, took a laxative with minimal relief. She then began to have episodes of vomiting, subjective fevers and chills at home with persistent lower abdominal pain. She denies abnormal vaginal discharge or foul smell. Endorses irregular menses, most recently menses started on 4/3 lasted 7 days which is normal for her, very heavy with passage of clots. She resumed bleeding a few days ago. She reports she intermittently has vaginal bleeding twice a month. She denies history of fibroids, abnormal papsmears, ovarian cysts. Admitted for severe sepsis 2/2 PID from bilateral tuboovarian abscesses.    Spoke to pt in room, pt appeared comfortable, laying in bed. Pt reports lack of appetite since , and hasn't been eating well. Reports diet of beef jerky and salads since the pandemic started. UBW of 320-340. Current wt of 399 lbs which indicates a wt gain of of at least 59 lbs, might be likely d/t dietary habits and meal choices. Reports to only have eaten <50% of meals at hospital d/t lack of appetite and unable to eat more than a couple bites. No swallowing or chewing difficulties. Recommend ONS to help meet needs. Pain 3/10 leg . Unable to obtain last BM as pt is unable to recall, pt on BM regimen per team. Will follow per RD protocol.

## 2021-04-22 NOTE — DISCHARGE NOTE PROVIDER - NSDCFUADDAPPT_GEN_ALL_CORE_FT
Please f/u with [facility] [address] [facility] on 4/27/2021 Tuesday  Please f/u with [facility] [address] Vines [address] [place] [address] on 4/27/2021 Tuesday at 1:30PM

## 2021-04-22 NOTE — DISCHARGE NOTE PROVIDER - NSDCCPCAREPLAN_GEN_ALL_CORE_FT
PRINCIPAL DISCHARGE DIAGNOSIS  Diagnosis: Sepsis, due to unspecified organism, unspecified whether acute organ dysfunction present  Assessment and Plan of Treatment:       SECONDARY DISCHARGE DIAGNOSES  Diagnosis: PID (pelvic inflammatory disease)  Assessment and Plan of Treatment:     Diagnosis: TOA (tubo-ovarian abscess)  Assessment and Plan of Treatment:

## 2021-04-22 NOTE — ADVANCED PRACTICE NURSE CONSULT - RECOMMEDATIONS
Pt presents with blurred vision and hypertension that began while at dialysis. Dialysis was not started due to pt's symptoms. Pt denies SOB, dizziness, chest pain, weakness or headache.    To perianal/inner gluteal cleft and inner thighs/ inguinal areas: cleanse gently with bath wipes by patting off soiled barrier. Apply Calmoseptine to all denuded skin and periwound skin. Apply BID and PRN after episode of incontinence and per patient request.

## 2021-04-22 NOTE — DISCHARGE NOTE PROVIDER - DETAILS OF MALNUTRITION DIAGNOSIS/DIAGNOSES
This patient has been assessed with a concern for Malnutrition and was treated during this hospitalization for the following Nutrition diagnosis/diagnoses:     -  04/22/2021: Morbid obesity (BMI > 40)

## 2021-04-22 NOTE — DIETITIAN INITIAL EVALUATION ADULT. - OTHER CALCULATIONS
ABW used to calculate energy needs due to pt's current body weight within % IBW (296%). Needs adj for age, wt, and  current infection/hypermetabolic state. Fluid needs: 1840 ml (30 ml/kg, IBW), Based on needs for adult.

## 2021-04-22 NOTE — DIETITIAN INITIAL EVALUATION ADULT. - MALNUTRITION
Pt does not meet criteria for malnutrition although pt does have decreased PO intake of <50% for 1 month. No wt loss.

## 2021-04-22 NOTE — PROGRESS NOTE ADULT - ASSESSMENT
35y  HD5 with PMH of morbid obesity presented for severe abdominal pain, n/v and fevers at home found to be septic secondary to bilateral tuboovarian abscesses. Now afebrile.    1. Neuro: oral pain medications  2. CV: VS per routine  HTN  - Medicine consulted and stated NTD while patient is septic, cont to monitor BP   3. Pulm: encourage IS and ambulation  R/O PE/DVT  - Limited study CTA and LE dopplers  4. GI: Regular diet, will start bowel regimen   5. : Weinstein placed  to stay in place until able to ambulate without severe pain to bathroom --> now resolving, consider d/c   6. DVT ppx: encourage ambulation, SCDs, 7500 U SQH BID  7. ID: Sepsis secondary to bilateral TOAs  - Continue antibiotic treatment with IV cefotetan/flagyl, PO doxy --> consider transition to all PO today   - Continue maintenance fluids, repeat labs pending this AM   - IR consulted  and unable to drain 2/2 position; MRI showed 3 abscesses and no peritonitis as well as submucosal fibroid  - f/u blood cultures  - f/u vaginitis panel, vaginal culture  Skin infection on thighs/vulva  - Keep area clean and dry  - s/p 1x dose fluconazole  - Wound care recommended silver nitrite and weinstein - now in place  8. Heme: heme consult - Leukocytosis, neutrophil and monocyte-predominant, likely inflammatory/reactive; anemia s/p 2 u prbc with minimal vaginal bleeding at this time  9. Psych: collateral information from mom states patient has intellectual disability  - SW consult states no need at this time but will cont to follow  Dispo: pending clinical improvement

## 2021-04-22 NOTE — DISCHARGE NOTE PROVIDER - CARE PROVIDER_API CALL
Deidra Vines)  Gynecologic Oncology  [address] [place] [address]  Phone: [phone]  Fax: [phone]  Follow Up Time: 2 weeks

## 2021-04-23 VITALS — TEMPERATURE: 99 F

## 2021-04-23 LAB
ANION GAP SERPL CALC-SCNC: 8 MMOL/L — SIGNIFICANT CHANGE UP (ref 5–17)
BASOPHILS # BLD AUTO: 0.07 K/UL — SIGNIFICANT CHANGE UP (ref 0–0.2)
BASOPHILS NFR BLD AUTO: 0.6 % — SIGNIFICANT CHANGE UP (ref 0–2)
BLD GP AB SCN SERPL QL: NEGATIVE — SIGNIFICANT CHANGE UP
BUN SERPL-MCNC: 7 MG/DL — SIGNIFICANT CHANGE UP (ref 7–23)
CALCIUM SERPL-MCNC: 8.8 MG/DL — SIGNIFICANT CHANGE UP (ref 8.4–10.5)
CHLORIDE SERPL-SCNC: 98 MMOL/L — SIGNIFICANT CHANGE UP (ref 96–108)
CO2 SERPL-SCNC: 29 MMOL/L — SIGNIFICANT CHANGE UP (ref 22–31)
CREAT SERPL-MCNC: 0.5 MG/DL — SIGNIFICANT CHANGE UP (ref 0.5–1.3)
CULTURE RESULTS: SIGNIFICANT CHANGE UP
CULTURE RESULTS: SIGNIFICANT CHANGE UP
EOSINOPHIL # BLD AUTO: 0.08 K/UL — SIGNIFICANT CHANGE UP (ref 0–0.5)
EOSINOPHIL NFR BLD AUTO: 0.7 % — SIGNIFICANT CHANGE UP (ref 0–6)
GLUCOSE SERPL-MCNC: 138 MG/DL — HIGH (ref 70–99)
HCT VFR BLD CALC: 26.6 % — LOW (ref 34.5–45)
HGB BLD-MCNC: 8.1 G/DL — LOW (ref 11.5–15.5)
IMM GRANULOCYTES NFR BLD AUTO: 4.1 % — HIGH (ref 0–1.5)
LYMPHOCYTES # BLD AUTO: 2.71 K/UL — SIGNIFICANT CHANGE UP (ref 1–3.3)
LYMPHOCYTES # BLD AUTO: 24.9 % — SIGNIFICANT CHANGE UP (ref 13–44)
MAGNESIUM SERPL-MCNC: 1.7 MG/DL — SIGNIFICANT CHANGE UP (ref 1.6–2.6)
MCHC RBC-ENTMCNC: 22.5 PG — LOW (ref 27–34)
MCHC RBC-ENTMCNC: 30.5 GM/DL — LOW (ref 32–36)
MCV RBC AUTO: 73.9 FL — LOW (ref 80–100)
MONOCYTES # BLD AUTO: 1.11 K/UL — HIGH (ref 0–0.9)
MONOCYTES NFR BLD AUTO: 10.2 % — SIGNIFICANT CHANGE UP (ref 2–14)
NEUTROPHILS # BLD AUTO: 6.45 K/UL — SIGNIFICANT CHANGE UP (ref 1.8–7.4)
NEUTROPHILS NFR BLD AUTO: 59.5 % — SIGNIFICANT CHANGE UP (ref 43–77)
NRBC # BLD: 0 /100 WBCS — SIGNIFICANT CHANGE UP (ref 0–0)
PHOSPHATE SERPL-MCNC: 2.9 MG/DL — SIGNIFICANT CHANGE UP (ref 2.5–4.5)
PLATELET # BLD AUTO: 714 K/UL — HIGH (ref 150–400)
POTASSIUM SERPL-MCNC: 3.9 MMOL/L — SIGNIFICANT CHANGE UP (ref 3.5–5.3)
POTASSIUM SERPL-SCNC: 3.9 MMOL/L — SIGNIFICANT CHANGE UP (ref 3.5–5.3)
RBC # BLD: 3.6 M/UL — LOW (ref 3.8–5.2)
RBC # FLD: 21.2 % — HIGH (ref 10.3–14.5)
RH IG SCN BLD-IMP: POSITIVE — SIGNIFICANT CHANGE UP
SODIUM SERPL-SCNC: 135 MMOL/L — SIGNIFICANT CHANGE UP (ref 135–145)
SPECIMEN SOURCE: SIGNIFICANT CHANGE UP
SPECIMEN SOURCE: SIGNIFICANT CHANGE UP
WBC # BLD: 10.87 K/UL — HIGH (ref 3.8–10.5)
WBC # FLD AUTO: 10.87 K/UL — HIGH (ref 3.8–10.5)

## 2021-04-23 PROCEDURE — P9016: CPT

## 2021-04-23 PROCEDURE — 85730 THROMBOPLASTIN TIME PARTIAL: CPT

## 2021-04-23 PROCEDURE — 93970 EXTREMITY STUDY: CPT

## 2021-04-23 PROCEDURE — 71045 X-RAY EXAM CHEST 1 VIEW: CPT

## 2021-04-23 PROCEDURE — 86780 TREPONEMA PALLIDUM: CPT

## 2021-04-23 PROCEDURE — 84702 CHORIONIC GONADOTROPIN TEST: CPT

## 2021-04-23 PROCEDURE — 96375 TX/PRO/DX INJ NEW DRUG ADDON: CPT

## 2021-04-23 PROCEDURE — 36415 COLL VENOUS BLD VENIPUNCTURE: CPT

## 2021-04-23 PROCEDURE — 99291 CRITICAL CARE FIRST HOUR: CPT | Mod: 25

## 2021-04-23 PROCEDURE — 84100 ASSAY OF PHOSPHORUS: CPT

## 2021-04-23 PROCEDURE — 87070 CULTURE OTHR SPECIMN AEROBIC: CPT

## 2021-04-23 PROCEDURE — 87040 BLOOD CULTURE FOR BACTERIA: CPT

## 2021-04-23 PROCEDURE — 86923 COMPATIBILITY TEST ELECTRIC: CPT

## 2021-04-23 PROCEDURE — 87801 DETECT AGNT MULT DNA AMPLI: CPT

## 2021-04-23 PROCEDURE — 97161 PT EVAL LOW COMPLEX 20 MIN: CPT

## 2021-04-23 PROCEDURE — 76830 TRANSVAGINAL US NON-OB: CPT

## 2021-04-23 PROCEDURE — 83735 ASSAY OF MAGNESIUM: CPT

## 2021-04-23 PROCEDURE — 82330 ASSAY OF CALCIUM: CPT

## 2021-04-23 PROCEDURE — 85379 FIBRIN DEGRADATION QUANT: CPT

## 2021-04-23 PROCEDURE — 81001 URINALYSIS AUTO W/SCOPE: CPT

## 2021-04-23 PROCEDURE — 86140 C-REACTIVE PROTEIN: CPT

## 2021-04-23 PROCEDURE — 96374 THER/PROPH/DIAG INJ IV PUSH: CPT

## 2021-04-23 PROCEDURE — 85027 COMPLETE CBC AUTOMATED: CPT

## 2021-04-23 PROCEDURE — 72196 MRI PELVIS W/DYE: CPT

## 2021-04-23 PROCEDURE — 84145 PROCALCITONIN (PCT): CPT

## 2021-04-23 PROCEDURE — 87798 DETECT AGENT NOS DNA AMP: CPT

## 2021-04-23 PROCEDURE — 86850 RBC ANTIBODY SCREEN: CPT

## 2021-04-23 PROCEDURE — 82803 BLOOD GASES ANY COMBINATION: CPT

## 2021-04-23 PROCEDURE — 87086 URINE CULTURE/COLONY COUNT: CPT

## 2021-04-23 PROCEDURE — 84436 ASSAY OF TOTAL THYROXINE: CPT

## 2021-04-23 PROCEDURE — 84295 ASSAY OF SERUM SODIUM: CPT

## 2021-04-23 PROCEDURE — U0003: CPT

## 2021-04-23 PROCEDURE — A9585: CPT

## 2021-04-23 PROCEDURE — 76856 US EXAM PELVIC COMPLETE: CPT

## 2021-04-23 PROCEDURE — 83605 ASSAY OF LACTIC ACID: CPT

## 2021-04-23 PROCEDURE — 87563 M. GENITALIUM AMP PROBE: CPT

## 2021-04-23 PROCEDURE — 80048 BASIC METABOLIC PNL TOTAL CA: CPT

## 2021-04-23 PROCEDURE — 36430 TRANSFUSION BLD/BLD COMPNT: CPT

## 2021-04-23 PROCEDURE — 87661 TRICHOMONAS VAGINALIS AMPLIF: CPT

## 2021-04-23 PROCEDURE — 87491 CHLMYD TRACH DNA AMP PROBE: CPT

## 2021-04-23 PROCEDURE — 84132 ASSAY OF SERUM POTASSIUM: CPT

## 2021-04-23 PROCEDURE — 71275 CT ANGIOGRAPHY CHEST: CPT

## 2021-04-23 PROCEDURE — 83690 ASSAY OF LIPASE: CPT

## 2021-04-23 PROCEDURE — U0005: CPT

## 2021-04-23 PROCEDURE — 86901 BLOOD TYPING SEROLOGIC RH(D): CPT

## 2021-04-23 PROCEDURE — 87591 N.GONORRHOEAE DNA AMP PROB: CPT

## 2021-04-23 PROCEDURE — 86769 SARS-COV-2 COVID-19 ANTIBODY: CPT

## 2021-04-23 PROCEDURE — 85384 FIBRINOGEN ACTIVITY: CPT

## 2021-04-23 PROCEDURE — 86900 BLOOD TYPING SEROLOGIC ABO: CPT

## 2021-04-23 PROCEDURE — 85025 COMPLETE CBC W/AUTO DIFF WBC: CPT

## 2021-04-23 PROCEDURE — 82728 ASSAY OF FERRITIN: CPT

## 2021-04-23 PROCEDURE — 87635 SARS-COV-2 COVID-19 AMP PRB: CPT

## 2021-04-23 PROCEDURE — 74177 CT ABD & PELVIS W/CONTRAST: CPT

## 2021-04-23 PROCEDURE — 85610 PROTHROMBIN TIME: CPT

## 2021-04-23 PROCEDURE — 83036 HEMOGLOBIN GLYCOSYLATED A1C: CPT

## 2021-04-23 PROCEDURE — 80053 COMPREHEN METABOLIC PANEL: CPT

## 2021-04-23 PROCEDURE — 84443 ASSAY THYROID STIM HORMONE: CPT

## 2021-04-23 PROCEDURE — 87389 HIV-1 AG W/HIV-1&-2 AB AG IA: CPT

## 2021-04-23 PROCEDURE — 93005 ELECTROCARDIOGRAM TRACING: CPT

## 2021-04-23 RX ORDER — METRONIDAZOLE 500 MG
1 TABLET ORAL
Qty: 24 | Refills: 0
Start: 2021-04-23 | End: 2021-04-30

## 2021-04-23 RX ORDER — ZINC OXIDE 200 MG/G
1 OINTMENT TOPICAL
Qty: 90 | Refills: 0
Start: 2021-04-23 | End: 2021-05-22

## 2021-04-23 RX ADMIN — Medication 600 MILLIGRAM(S): at 06:28

## 2021-04-23 RX ADMIN — SODIUM CHLORIDE 3 MILLILITER(S): 9 INJECTION INTRAMUSCULAR; INTRAVENOUS; SUBCUTANEOUS at 06:28

## 2021-04-23 RX ADMIN — Medication 600 MILLIGRAM(S): at 06:41

## 2021-04-23 RX ADMIN — Medication 1 PATCH: at 06:28

## 2021-04-23 RX ADMIN — Medication 1000 MILLIGRAM(S): at 04:49

## 2021-04-23 RX ADMIN — Medication 500 MILLIGRAM(S): at 06:28

## 2021-04-23 RX ADMIN — Medication 600 MILLIGRAM(S): at 00:00

## 2021-04-23 RX ADMIN — Medication 100 MILLIGRAM(S): at 09:37

## 2021-04-23 RX ADMIN — Medication 450 MILLIGRAM(S): at 06:27

## 2021-04-23 RX ADMIN — OXYCODONE HYDROCHLORIDE 5 MILLIGRAM(S): 5 TABLET ORAL at 09:37

## 2021-04-23 RX ADMIN — HEPARIN SODIUM 7500 UNIT(S): 5000 INJECTION INTRAVENOUS; SUBCUTANEOUS at 06:27

## 2021-04-23 RX ADMIN — PANTOPRAZOLE SODIUM 40 MILLIGRAM(S): 20 TABLET, DELAYED RELEASE ORAL at 06:27

## 2021-04-23 RX ADMIN — Medication 1000 MILLIGRAM(S): at 06:41

## 2021-04-23 NOTE — PROGRESS NOTE ADULT - ASSESSMENT
35y  HD6 with PMH of morbid obesity presented for severe abdominal pain, n/v and fevers at home found to be septic secondary to bilateral tuboovarian abscesses. Now afebrile.    1. Neuro: oral pain medications  2. CV: VS per routine  HTN  - Medicine consulted and stated NTD while patient is septic, cont to monitor BP   3. Pulm: encourage IS and ambulation  R/O PE/DVT  - Limited study CTA and LE dopplers  4. GI: Regular diet, started on Ensure BID  5. : s/p weinstein, no issues with voiding  6. DVT ppx: encourage ambulation, SCDs, 7500 U SQH BID  7. ID: Sepsis secondary to bilateral TOAs  - transitioned to PO abx: clindamycin 450mg QID, doxycycline 100mg BID, flagyl 500mg Q8H  - Repeat labs pending this AM   - IR consulted  and unable to drain 2/2 position; MRI showed 3 abscesses and no peritonitis as well as submucosal fibroid  - f/u blood cultures  - f/u vaginitis panel, vaginal culture  Skin infection on thighs/vulva  - Keep area clean and dry  - s/p 1x dose fluconazole  - Wound care recommended calmoceptine, currently applying BID  8. Heme: heme consult - Leukocytosis, neutrophil and monocyte-predominant, likely inflammatory/reactive; anemia s/p 2 u prbc with minimal vaginal bleeding at this time  9. Psych: collateral information from mom states patient has intellectual disability  - SW consult states no need at this time but will cont to follow  Dispo: d/c today

## 2021-04-23 NOTE — PROGRESS NOTE ADULT - REASON FOR ADMISSION
sepsis secondary tobilateral TOAs
sepsis secondary to bilateral TOAs
sepsis secondary tobilateral TOAs
sepsis secondary tobilateral TOAs
sepsis secondary to bilateral TOAs
sepsis secondary tobilateral TOAs

## 2021-04-23 NOTE — PROGRESS NOTE ADULT - NUTRITIONAL ASSESSMENT
This patient has been assessed with a concern for Malnutrition and has been determined to have a diagnosis/diagnoses of Morbid obesity (BMI > 40).    This patient is being managed with:   Diet Regular-  Consistent Carbohydrate {No Snacks} (CSTCHO)  Supplement Feeding Modality:  Oral  Ensure High Protein Cans or Servings Per Day:  1       Frequency:  Two Times a day  Entered: Apr 22 2021  4:37PM

## 2021-04-23 NOTE — PROGRESS NOTE ADULT - SUBJECTIVE AND OBJECTIVE BOX
GYN Progress Note    Patient seen at bedside.  Pain controlled on tylenol and motrin ATC, oxy prn  Tolerating regular diet w/ ensure  OOB, no issues with ambulation  Voiding without difficulty  +flatus    Denies CP, palpitations, SOB, fever, chills, nausea, vomiting.    Vital Signs Last 24 Hrs  T(C): 36.9 (23 Apr 2021 04:50), Max: 37.2 (22 Apr 2021 09:06)  T(F): 98.4 (23 Apr 2021 04:50), Max: 98.9 (22 Apr 2021 09:06)  HR: 82 (23 Apr 2021 03:59) (82 - 98)  BP: 104/59 (23 Apr 2021 03:59) (104/59 - 145/79)  BP(mean): 80 (23 Apr 2021 03:59) (80 - 113)  RR: 18 (23 Apr 2021 03:59) (18 - 18)  SpO2: 96% (23 Apr 2021 03:59) (96% - 100%)    Physical Exam:  GA: laying in bariatric bed  Resp: normal respiratory effort  Abd: +BS, soft, nontender to palpation, nondistended, no rebound or guarding  Derm: skin between legs and perineum with baby powder in place, dry, minimally tender to touch. No obvious lesions noted  Extrem: SCDs in place, no LE edema    I&O's Summary    22 Apr 2021 07:01  -  23 Apr 2021 07:00  --------------------------------------------------------  IN: 480 mL / OUT: 1350 mL / NET: -870 mL      MEDICATIONS  (STANDING):  bisacodyl 10 milliGRAM(s) Oral at bedtime  clindamycin   Capsule 450 milliGRAM(s) Oral four times a day  doxycycline hyclate Capsule 100 milliGRAM(s) Oral every 12 hours  heparin   Injectable 7500 Unit(s) SubCutaneous every 8 hours  ibuprofen  Tablet. 600 milliGRAM(s) Oral every 6 hours  melatonin 5 milliGRAM(s) Oral at bedtime  metroNIDAZOLE    Tablet 500 milliGRAM(s) Oral every 8 hours  nicotine -   7 mG/24Hr(s) Patch 1 patch Transdermal daily  pantoprazole    Tablet 40 milliGRAM(s) Oral before breakfast  polyethylene glycol 3350 17 Gram(s) Oral daily  senna 2 Tablet(s) Oral at bedtime  sodium chloride 0.9% lock flush 3 milliLiter(s) IV Push every 8 hours    MEDICATIONS  (PRN):  acetaminophen   Tablet .. 1000 milliGRAM(s) Oral every 6 hours PRN Mild Pain (1 - 3)  ondansetron Injectable 8 milliGRAM(s) IV Push every 8 hours PRN Nausea and/or Vomiting  oxyCODONE    IR 5 milliGRAM(s) Oral every 4 hours PRN Moderate Pain (4 - 6)      LABS:                        8.1    10.87 )-----------( 714      ( 23 Apr 2021 06:56 )             26.6     04-23    135  |  98  |  7   ----------------------------<  138<H>  3.9   |  29  |  0.50    Ca    8.8      23 Apr 2021 06:56  Phos  2.9     04-23  Mg     1.7     04-23                
Patient evaluated at bedside. She has no complaints. She reports pain is well controlled with medications. She denies HA, dizziness, SOB, CP, palpitations, n/v.  Mccain in place, last BM 4 days ago.    T(C): 36.8 (04-22-21 @ 04:35), Max: 36.8 (04-22-21 @ 04:35)  HR: 92 (04-22-21 @ 00:50) (92 - 104)  BP: 158/83 (04-22-21 @ 00:50) (141/94 - 158/83)  RR: 17 (04-22-21 @ 00:50) (17 - 18)  SpO2: 97% (04-22-21 @ 00:50) (97% - 98%)    GA: laying in bariatric bed  Resp: normal respiratory effort  Abd: +BS, soft, tender to deep palpation, nondistended, no rebound or guarding  Derm: skin between legs and perineum with silver nitrate and baby powder in place, dry, tender to touch  Extrem: SCDs in place, no LE edema       04-21 @ 07:01  -  04-22 @ 06:39  --------------------------------------------------------  IN: 0 mL / OUT: 1950 mL / NET: -1950 mL                              7.7    15.74 )-----------( 682      ( 21 Apr 2021 06:50 )             24.3     04-21    133<L>  |  98  |  6<L>  ----------------------------<  110<H>  4.0   |  25  |  0.51    Ca    8.5      21 Apr 2021 06:50  Phos  3.2     04-21  Mg     2.0     04-21    TPro  6.6  /  Alb  2.4<L>  /  TBili  0.2  /  DBili  x   /  AST  8<L>  /  ALT  <5<L>  /  AlkPhos  77  04-21    
LENGTH OF HOSPITAL STAY: 3d    CHIEF COMPLAINT:   Patient is a 35y old  Female who presents with a chief complaint of sepsis secondary tobilateral TOAs (2021 13:41)      HISTORY OF PRESENTING ILLNESS:    HPI:  35y  w/PMH of morbid obesity presenting for severe abdominal pain, n/v and fevers at home. Patient reports she has been having abdominal pain since the beginning of the month. She initially thought it was due to constipation, took a laxative with minimal relief. She then began to have episodes of vomiting, subjective fevers and chills at home with persistent lower abdominal pain. She denies abnormal vaginal discharge or foul smell. Endorses irregular menses, most recently menses started on 4/3 lasted 7 days which is normal for her, very heavy with passage of clots. She resumed bleeding a few days ago. She reports she intermittently has vaginal bleeding twice a month. She denies history of fibroids, abnormal papsmears, ovarian cysts.   Denies, chest pain, palpitations, SOB. +flatus, +BM    In the ED patient tachycardic to 130s, BP normal to elevated BP. WBC 49, H 8.5, Platelets 1213  d-dimer 1312, lactate 4.4, c-reactive protein. She received IVF, vanc/zosyn with improvement in lactate to 2.4.     OB H/x:  SAB x3    GYN H/x: denies history of fibroids, abnormal papsmears, ovarian cysts, STDs  Last sexually active 3 years ago w/men only  Last saw a gyn 1 year ago, exam wnl per patient   Hx of abnormal menses, irregular    MED H/x: denies    SURG H/x: denies    Medications: denies  Allergies: NKDA       Vital Signs Last 24 Hrs  T(C): 38.1 (2021 14:32), Max: 38.1 (2021 14:32)  T(F): 100.5 (2021 14:32), Max: 100.5 (2021 14:32)  HR: 114 (2021 16:44) (114 - 134)  BP: 143/79 (2021 16:44) (126/79 - 174/84)  BP(mean): --  RR: 18 (2021 16:44) (16 - 20)  SpO2: 97% (2021 16:44) (97% - 99%)    Physical Exam:  Gen: NAD, comfortable  GI: obese, nondistended, soft, tender to palpation in LLQ and RLQ and suprapubically, +BS , no rebound no guarding  BME: irritation of b/l inner thighs with abrasions.  Difficult to palpate uterus due to body habitus, tender throughout, fullness in left and right adnexa  Spec: difficult to visualize cervix, 10cc dark red vaginal blood, no active bleeding, foul smelling  Ext: no edema, erythema, tenderness     LABS:                        8.4    41.83 )-----------( 977      ( 2021 15:05 )             28.7     04-18    130<L>  |  93<L>  |  13  ----------------------------<  193<H>  4.2   |  20<L>  |  1.28    Ca    9.4      2021 14:20  Mg     1.8     -18    TPro  8.0  /  Alb  3.3  /  TBili  0.2  /  DBili  x   /  AST  15  /  ALT  8<L>  /  AlkPhos  102  04-18    PT/INR - ( 2021 14:20 )   PT: 16.7 sec;   INR: 1.41          PTT - ( 2021 14:20 )  PTT:29.3 sec      RADIOLOGY & ADDITIONAL STUDIES:      EXAM:  CT ANGIO CHEST PE PROTOCOL IC                          PROCEDURE DATE:  2021          INTERPRETATION:  CTA (CT angiography) of the CHEST dated 2021 4:42 PM    INDICATION: Low-grade fever. Persistent tachycardia. Assess for pulmonary embolism.    TECHNIQUE: CT angiography of the chest was performed during bolus injection of intravenous contrast.  Post-processing including the production of axial, coronal and sagittal multiplanar reformatted images and axial and coronal maximum intensity projections (MIPs) was performed. 100 mL of 3/3/1951 injected.    PRIOR STUDY: None.    FINDINGS: Extremely limited study. Inadequate contrast opacification of the pulmonary arteries. No gross central PE. Nondiagnostic for evaluation of the lobar, segmental and subsegmental pulmonary arteries., subsegmental pulmonary arteries is The thoracic aorta is normal in appearance. The heart is normal in size. Left ventricular hypertrophy. No pericardial effusion is seen. No mediastinal, hilar or axillary lymphadenopathy is seen.    No pleural effusions are seen. No significant pulmonary abnormalities are evident.      Evaluation of the osseous structures demonstrates no significant findings.      IMPRESSION: Extremely limited study. No gross central PE.                Thank you for the opportunity to participate in the care of this patient.      GERA RYAN MD; Attending Radiologist  This document has been electronically signed. 2021  5:08PM      EXAM:  CT ABDOMEN AND PELVIS IC                          PROCEDURE DATE:  2021          INTERPRETATION:  CT of the ABDOMEN and PELVIS with intravenous contrast dated 2021 4:42 PM    INDICATION: low grade fever, sespsis vs leukomoid rxn vs malignancy    TECHNIQUE: CT of the abdomen and pelvis was performed. Axial and coronal  and sagittal images were produced and reviewed.    CONTRAST USAGE:  IV contrast: Optiray 350: 100 ml administered; ml discarded.    Oral contrast: administered.    PRIOR STUDIES: None.    FINDINGS: Images of the lower chest demonstrate no abnormality.    The liver is enlarged. No focal liver lesion. Trace perihepatic fluid. No radiopaque stones are seen in the gallbladder. The gallbladder is distended. The pancreas is normal in appearance.  No splenic abnormalities are seen.    The adrenal glands are unremarkable. The kidneys are normal in size. 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 Subcentimeter cortical hypodensity right kidney, too small to characterize. No hydronephrosis. Possible renal fetal lobulation.    No abdominal aortic aneurysm is seen. No retroperitoneal lymphadenopathy is seen. Small mesenteric nodes.    Evaluation of the bowel is limited due to lack of oral contrast material. Normal appendix. No bowel obstruction.. Small amount of perisplenic ascites. Small amount ascites in the right paracolic gutter. Extensive infiltration of the anterior omental fat. Bilocular small fluid collection in the mesentery contiguous with the abnormal left fallopian tube.    Images of the pelvis demonstrate an enlarged lobulated multi fibroid uterus. The uterus measures 15.3 cm x 8.9 cm x 8.5 cm. Appears to be a 5 cm submucosal leiomyoma in relation to the lower uterine segment. There is a 7.7 x 4.4 x 4.2 cm loculated fluid collection with enhancing wall in the region of the cul-de-sac. There are bilateral thick-walled tubular adnexal structures on the right measuring 8.9 x 3.9 x 3.0 cm and on the left 4.8 x 7.3 x 2.9 cm. The latter structures are contiguous with the ovaries. The urinary bladder is collapsed.    Evaluation of the osseous structures demonstrates degenerative disc disease L5-S1.      IMPRESSION: The pelvic findings are highly suggestive of PID with bilateral pyosalpinges/tubo-ovarian abscesses. Enlarged anteverted lobulated multi fibroid uterus. Infiltration of the omental fat.                Thank you for the opportunity to participate in the care of this patient.      GERA RYAN MD; Attending Radiologist  This document has been electronically signed. 2021  4:59PM     (2021 18:19)    PAST MEDICAL & SURGICAL HISTORY:  PAST MEDICAL & SURGICAL HISTORY:  Morbid obesity      SOCIAL HISTORY:    ALLERGIES:  No Known Allergies    MEDICATIONS:  STANDING MEDICATIONS  bisacodyl 5 milliGRAM(s) Oral at bedtime  cefoTEtan  IVPB 2 Gram(s) IV Intermittent every 12 hours  cefoTEtan  IVPB      doxycycline hyclate Capsule 100 milliGRAM(s) Oral every 12 hours  heparin   Injectable 7500 Unit(s) SubCutaneous every 8 hours  ibuprofen  Tablet. 600 milliGRAM(s) Oral every 6 hours  melatonin 5 milliGRAM(s) Oral at bedtime  metroNIDAZOLE  IVPB 500 milliGRAM(s) IV Intermittent every 8 hours  nicotine -   7 mG/24Hr(s) Patch 1 patch Transdermal daily  polyethylene glycol 3350 17 Gram(s) Oral daily  senna 2 Tablet(s) Oral at bedtime  sodium chloride 0.9% lock flush 3 milliLiter(s) IV Push every 8 hours  zinc oxide 20% Ointment 1 Application(s) Topical three times a day    PRN MEDICATIONS  acetaminophen   Tablet .. 1000 milliGRAM(s) Oral every 6 hours PRN  ondansetron Injectable 8 milliGRAM(s) IV Push every 8 hours PRN  oxyCODONE    IR 5 milliGRAM(s) Oral every 4 hours PRN  oxyCODONE    IR 10 milliGRAM(s) Oral every 6 hours PRN    VITALS:   T(F): 98  HR: 80  BP: 140/77  RR: 18  SpO2: 97%    LABS:                        7.7    15.74 )-----------( 682      ( 2021 06:50 )             24.3     04-21    133<L>  |  98  |  6<L>  ----------------------------<  110<H>  4.0   |  25  |  0.51    Ca    8.5      2021 06:50  Phos  3.2       Mg     2.0         TPro  6.6  /  Alb  2.4<L>  /  TBili  0.2  /  DBili  x   /  AST  8<L>  /  ALT  <5<L>  /  AlkPhos  77      Culture - Genital (collected 2021 13:05)  Source: .Genital Vaginal  Final Report (2021 10:27):    Normal vaginal aniceto isolated    Culture - Urine (collected 2021 21:58)  Source: .Urine Clean Catch (Midstream)  Final Report (2021 08:56):    No growth    RADIOLOGY:  Reviewed    PHYSICAL EXAM:  Constitutional: Young female resting comfortably in bed, obese  HEENT: NC/AT; PERRL, anicteric sclera; no oropharyngeal erythema or exudates; MMM  Neck: supple, no appreciable JVD  Respiratory: CTA B/L, no W/R/R; respirations appear non-labored, conversive in full sentences  Cardiovascular: +S1/S2, RRR  Gastrointestinal: obese, abdomen soft, NT/ND  Extremities: WWP; no clubbing, cyanosis or edema  Vascular: 2+ radial, femoral, and DP/PT pulses B/L  Dermatologic: skin normal color and turgor; no visible rashes  Neurological: aox3  
Patient evaluated at bedside. She states she has significant discomfort on her skin between her thighs and some discomfort intraabdominally diffusely.  States the oxycodone over night did not decrease the pain.  She denies any current vaginal bleeding, but there was some when going to the bathroom.  Denies fevers/chills.  She denies HA, dizziness, SOB, CP, palpitations, n/v.    T(C): 37.2 (04-20-21 @ 04:56), Max: 37.2 (04-19-21 @ 20:36)  HR: 94 (04-20-21 @ 04:20) (94 - 114)  BP: 136/77 (04-20-21 @ 04:20) (122/73 - 137/72)  RR: 18 (04-20-21 @ 04:20) (16 - 18)  SpO2: 98% (04-20-21 @ 04:20) (96% - 98%)    GA: laying in bariatric bed  Resp: normal respiratory effort  Abd: +BS, soft, tender to deep palpation, nondistended, no rebound or guarding  Derm: skin between legs and perineum with silver nitrate and baby powder in place, dry, tender to touch  Extrem: SCDs in place, no LE edema       04-19 @ 07:01  -  04-20 @ 07:00  --------------------------------------------------------  IN: 600 mL / OUT: 1200 mL / NET: -600 mL                              8.1    22.84 )-----------( 719      ( 20 Apr 2021 06:46 )             26.3     04-19    132<L>  |  96  |  15  ----------------------------<  113<H>  3.6   |  24  |  0.95    Ca    8.9      19 Apr 2021 08:10  Phos  3.2     04-19  Mg     1.7     04-19    TPro  8.0  /  Alb  3.3  /  TBili  0.2  /  DBili  x   /  AST  15  /  ALT  8<L>  /  AlkPhos  102  04-18    
Patient evaluated at bedside. She complains of worsening discomfort and pain between her legs on the skin.  States she has been sweating, causing more irritation.  Denies any fevers, chills over night.  She has been NPO since MN but prior to that only tolerated apple sauce.  Denies current nausea, vomiting.  Internal pelvic pain currently resolved with medications. She denies HA, dizziness, SOB, CP, palpitations, n/v.    T(C): 37.1 (04-19-21 @ 05:35), Max: 37.1 (04-19-21 @ 05:35)  HR: 106 (04-19-21 @ 04:52) (98 - 128)  BP: 133/74 (04-19-21 @ 04:52) (133/74 - 156/99)  RR: 18 (04-19-21 @ 04:52) (18 - 19)  SpO2: 98% (04-19-21 @ 04:52) (97% - 100%)    GA: tearful  Resp: normal respiratory effort  Abd: +BS, obese soft, nondistended, tender to deep palpation, no rebound or guarding  : pad in place, erythematous, indurated, peeling skin on inner thighs and vulva, tender to touch  Extrem: SCDs in place, no LE edema       04-18 @ 07:01  -  04-19 @ 07:00  --------------------------------------------------------  IN: 2225 mL / OUT: 300 mL / NET: 1925 mL                              8.4    41.83 )-----------( 977      ( 18 Apr 2021 15:05 )             28.7     04-18    130<L>  |  93<L>  |  13  ----------------------------<  193<H>  4.2   |  20<L>  |  1.28    Ca    9.4      18 Apr 2021 14:20  Mg     1.8     04-18    TPro  8.0  /  Alb  3.3  /  TBili  0.2  /  DBili  x   /  AST  15  /  ALT  8<L>  /  AlkPhos  102  04-18    
PRESLEY HEIN 35y Female    Interval HPI:  Pain much improved. Does still report some vaginal bleeding. Denies fevers, chills, sob, cp, HA.     Patient seen and examined at bedside:    T(C): 36.7 (04-21-21 @ 09:05), Max: 37.2 (04-20-21 @ 22:00)  HR: 82 (04-21-21 @ 12:13) (78 - 101)  BP: 146/66 (04-21-21 @ 12:13) (124/59 - 154/72)  RR: 18 (04-21-21 @ 12:13) (18 - 20)  SpO2: 97% (04-21-21 @ 12:13) (92% - 100%)    Review of systems negative except as mentioned above    acetaminophen   Tablet .. 1000 milliGRAM(s) Oral every 6 hours PRN  bisacodyl 5 milliGRAM(s) Oral at bedtime  cefoTEtan  IVPB 2 Gram(s) IV Intermittent every 12 hours  cefoTEtan  IVPB      doxycycline hyclate Capsule 100 milliGRAM(s) Oral every 12 hours  heparin   Injectable 7500 Unit(s) SubCutaneous every 8 hours  ibuprofen  Tablet. 600 milliGRAM(s) Oral every 6 hours  melatonin 5 milliGRAM(s) Oral at bedtime  metroNIDAZOLE  IVPB 500 milliGRAM(s) IV Intermittent every 8 hours  nicotine -   7 mG/24Hr(s) Patch 1 patch Transdermal daily  ondansetron Injectable 8 milliGRAM(s) IV Push every 8 hours PRN  oxyCODONE    IR 5 milliGRAM(s) Oral every 4 hours PRN  oxyCODONE    IR 10 milliGRAM(s) Oral every 6 hours PRN  polyethylene glycol 3350 17 Gram(s) Oral daily  senna 2 Tablet(s) Oral at bedtime  sodium chloride 0.9% lock flush 3 milliLiter(s) IV Push every 8 hours  zinc oxide 20% Ointment 1 Application(s) Topical three times a day      Physical Exam:    Constitutional: Young female resting comfortably in bed, NAD  HEENT: NC/AT; PERRL, anicteric sclera; no oropharyngeal erythema or exudates; MMM  Neck: supple, no appreciable JVD  Respiratory: CTA B/L, no W/R/R; respirations appear non-labored, conversive in full sentences  Cardiovascular: +S1/S2, RRR  Gastrointestinal: obese, abdomen soft, NT/ND  Extremities: WWP; no clubbing, cyanosis or edema  Vascular: 2+ radial, femoral, and DP/PT pulses B/L  Dermatologic: skin normal color and turgor; no visible rashes  Neurological: aox3    Labs:                        7.7    15.74 )-----------( 682      ( 21 Apr 2021 06:50 )             24.3     04-21    133<L>  |  98  |  6<L>  ----------------------------<  110<H>  4.0   |  25  |  0.51    Ca    8.5      21 Apr 2021 06:50  Phos  3.2     04-21  Mg     2.0     04-21    TPro  6.6  /  Alb  2.4<L>  /  TBili  0.2  /  DBili  x   /  AST  8<L>  /  ALT  <5<L>  /  AlkPhos  77  04-21          CAPILLARY BLOOD GLUCOSE                Imaging:  
PRESLEY HEIN 35y Female    Interval HPI:  Patient reports still experiencing abdominal pain although slightly improved from yesterday, she says she would like pain regimen to be more frequent. She denies any vaginal bleeding, lightheadedness, palpitations, sob, cp.     Patient seen and examined at bedside:    T(C): 36.6 (04-20-21 @ 09:00), Max: 37.2 (04-19-21 @ 20:36)  HR: 104 (04-20-21 @ 08:15) (94 - 116)  BP: 142/89 (04-20-21 @ 08:15) (122/73 - 150/78)  RR: 18 (04-20-21 @ 08:15) (16 - 18)  SpO2: 98% (04-20-21 @ 08:15) (96% - 100%)    Review of systems negative except as mentioned above    acetaminophen   Tablet .. 1000 milliGRAM(s) Oral every 6 hours PRN  cefoTEtan  IVPB 2 Gram(s) IV Intermittent every 12 hours  cefoTEtan  IVPB      doxycycline hyclate Capsule 100 milliGRAM(s) Oral every 12 hours  ibuprofen  Tablet. 600 milliGRAM(s) Oral every 6 hours PRN  melatonin 5 milliGRAM(s) Oral at bedtime  metroNIDAZOLE  IVPB 500 milliGRAM(s) IV Intermittent every 8 hours  nicotine -   7 mG/24Hr(s) Patch 1 patch Transdermal daily  ondansetron Injectable 8 milliGRAM(s) IV Push every 8 hours PRN  oxyCODONE    IR 5 milliGRAM(s) Oral every 4 hours PRN  oxyCODONE    IR 10 milliGRAM(s) Oral once PRN  sodium chloride 0.9% lock flush 3 milliLiter(s) IV Push every 8 hours  zinc oxide 20% Ointment 1 Application(s) Topical three times a day      Physical Exam:    Constitutional: Young female resting comfortably in bed, NAD  HEENT: NC/AT; PERRL, anicteric sclera; no oropharyngeal erythema or exudates; MMM  Neck: supple, no appreciable JVD  Respiratory: CTA B/L, no W/R/R; respirations appear non-labored, conversive in full sentences  Cardiovascular: +S1/S2, RRR  Gastrointestinal: obese, abdomen soft, NT/ND  Extremities: WWP; no clubbing, cyanosis or edema  Vascular: 2+ radial, femoral, and DP/PT pulses B/L  Dermatologic: skin normal color and turgor; no visible rashes  Neurological: aox3    Labs:                        8.1    22.84 )-----------( 719      ( 20 Apr 2021 06:46 )             26.3     04-20    131<L>  |  97  |  9   ----------------------------<  109<H>  3.6   |  23  |  0.52    Ca    8.7      20 Apr 2021 06:46  Phos  3.1     04-20  Mg     1.8     04-20    TPro  8.0  /  Alb  3.3  /  TBili  0.2  /  DBili  x   /  AST  15  /  ALT  8<L>  /  AlkPhos  102  04-18    PT/INR - ( 19 Apr 2021 08:10 )   PT: 15.9 sec;   INR: 1.34          PTT - ( 19 Apr 2021 08:10 )  PTT:30.0 sec  Urinalysis Basic - ( 18 Apr 2021 20:28 )    Color: Yellow / Appearance: Clear / SG: <=1.005 / pH: x  Gluc: x / Ketone: NEGATIVE  / Bili: Negative / Urobili: 0.2 E.U./dL   Blood: x / Protein: Trace mg/dL / Nitrite: NEGATIVE   Leuk Esterase: NEGATIVE / RBC: < 5 /HPF / WBC > 10 /HPF   Sq Epi: x / Non Sq Epi: 5-10 /HPF / Bacteria: Present /HPF        CAPILLARY BLOOD GLUCOSE                Imaging:  
Patient evaluated at bedside. She states her abdominal and leg pain are much improved and she slept some overnight.  She endorses current vaginal bleeding, and she now has a weinstein in place.  Continues to endorse lack of appetite.  Denies fevers/chills.  She denies HA, dizziness, SOB, CP, palpitations, n/v.    PHYSICAL EXAM:   Vital Signs Last 24 Hrs  T(C): 37 (21 Apr 2021 04:28), Max: 37.2 (20 Apr 2021 22:00)  T(F): 98.6 (21 Apr 2021 04:28), Max: 98.9 (20 Apr 2021 22:00)  HR: 88 (21 Apr 2021 03:50) (88 - 104)  BP: 124/59 (21 Apr 2021 03:50) (124/59 - 154/72)  BP(mean): 82 (21 Apr 2021 03:50) (82 - 110)  RR: 18 (21 Apr 2021 03:50) (18 - 20)  SpO2: 92% (21 Apr 2021 03:50) (92% - 100%)    **************************  GA: laying in bariatric bed  Resp: normal respiratory effort  Abd: +BS, soft, tender to deep palpation, nondistended, no rebound or guarding  Derm: skin between legs and perineum with silver nitrate and baby powder in place, dry, tender to touch  Extrem: SCDs in place, no LE edema       LABS:                        7.7    15.74 )-----------( 682      ( 21 Apr 2021 06:50 )             24.3     04-20    131<L>  |  97  |  9   ----------------------------<  109<H>  3.6   |  23  |  0.52    Ca    8.7      20 Apr 2021 06:46  Phos  3.2     04-21  Mg     2.0     04-21      PT/INR - ( 19 Apr 2021 08:10 )   PT: 15.9 sec;   INR: 1.34          PTT - ( 19 Apr 2021 08:10 )  PTT:30.0 sec        RADIOLOGY & ADDITIONAL STUDIES:

## 2021-04-23 NOTE — DISCHARGE NOTE NURSING/CASE MANAGEMENT/SOCIAL WORK - NSDCFUADDAPPT_GEN_ALL_CORE_FT
Please f/u with [facility] [address] Dr Vines [address] [place] [address] on 4/27/2021 Tuesday at 1:30PM

## 2021-04-23 NOTE — DISCHARGE NOTE NURSING/CASE MANAGEMENT/SOCIAL WORK - PATIENT PORTAL LINK FT
You can access the FollowMyHealth Patient Portal offered by [facility] by registering at the following website: [email or web address]. By joining [facility]’s FollowMyHealth portal, you will also be able to view your health information using other applications (apps) compatible with our system.

## 2021-04-24 LAB
A VAGINAE DNA VAG QL NAA+PROBE: SIGNIFICANT CHANGE UP
BVAB2 DNA VAG QL NAA+PROBE: SIGNIFICANT CHANGE UP
C ALBICANS DNA VAG QL NAA+PROBE: NEGATIVE — SIGNIFICANT CHANGE UP
C GLABRATA DNA VAG QL NAA+PROBE: NEGATIVE — SIGNIFICANT CHANGE UP
M GENITALIUM DNA SPEC QL NAA+PROBE: NEGATIVE — SIGNIFICANT CHANGE UP
M HOMINIS DNA SPEC QL NAA+PROBE: NEGATIVE — SIGNIFICANT CHANGE UP
MEGA1 DNA VAG QL NAA+PROBE: SIGNIFICANT CHANGE UP
T VAGINALIS RRNA SPEC QL NAA+PROBE: NEGATIVE — SIGNIFICANT CHANGE UP
UREAPLASMA DNA SPEC QL NAA+PROBE: POSITIVE

## 2021-04-27 DIAGNOSIS — E87.1 HYPO-OSMOLALITY AND HYPONATREMIA: ICD-10-CM

## 2021-04-27 DIAGNOSIS — E22.2 SYNDROME OF INAPPROPRIATE SECRETION OF ANTIDIURETIC HORMONE: ICD-10-CM

## 2021-04-27 DIAGNOSIS — A41.9 SEPSIS, UNSPECIFIED ORGANISM: ICD-10-CM

## 2021-04-27 DIAGNOSIS — I10 ESSENTIAL (PRIMARY) HYPERTENSION: ICD-10-CM

## 2021-04-27 DIAGNOSIS — R65.20 SEVERE SEPSIS WITHOUT SEPTIC SHOCK: ICD-10-CM

## 2021-04-27 DIAGNOSIS — L98.9 DISORDER OF THE SKIN AND SUBCUTANEOUS TISSUE, UNSPECIFIED: ICD-10-CM

## 2021-04-27 DIAGNOSIS — D62 ACUTE POSTHEMORRHAGIC ANEMIA: ICD-10-CM

## 2021-04-27 DIAGNOSIS — E66.01 MORBID (SEVERE) OBESITY DUE TO EXCESS CALORIES: ICD-10-CM

## 2021-04-27 DIAGNOSIS — N70.93 SALPINGITIS AND OOPHORITIS, UNSPECIFIED: ICD-10-CM

## 2021-04-27 DIAGNOSIS — B35.6 TINEA CRURIS: ICD-10-CM

## 2021-04-27 DIAGNOSIS — R73.03 PREDIABETES: ICD-10-CM

## 2021-05-19 ENCOUNTER — EMERGENCY (EMERGENCY)
Facility: HOSPITAL | Age: 36
LOS: 1 days | Discharge: HOME | End: 2021-05-19
Attending: EMERGENCY MEDICINE | Admitting: EMERGENCY MEDICINE
Payer: MEDICARE

## 2021-05-19 VITALS
DIASTOLIC BLOOD PRESSURE: 96 MMHG | WEIGHT: 293 LBS | HEART RATE: 107 BPM | SYSTOLIC BLOOD PRESSURE: 130 MMHG | HEIGHT: 67 IN | TEMPERATURE: 98 F | OXYGEN SATURATION: 98 % | RESPIRATION RATE: 18 BRPM

## 2021-05-19 VITALS
RESPIRATION RATE: 17 BRPM | DIASTOLIC BLOOD PRESSURE: 101 MMHG | OXYGEN SATURATION: 99 % | SYSTOLIC BLOOD PRESSURE: 168 MMHG | HEART RATE: 88 BPM

## 2021-05-19 DIAGNOSIS — R10.2 PELVIC AND PERINEAL PAIN: ICD-10-CM

## 2021-05-19 DIAGNOSIS — E66.01 MORBID (SEVERE) OBESITY DUE TO EXCESS CALORIES: ICD-10-CM

## 2021-05-19 DIAGNOSIS — N70.93 SALPINGITIS AND OOPHORITIS, UNSPECIFIED: ICD-10-CM

## 2021-05-19 LAB
ALBUMIN SERPL ELPH-MCNC: 4.4 G/DL — SIGNIFICANT CHANGE UP (ref 3.3–5)
ALP SERPL-CCNC: 69 U/L — SIGNIFICANT CHANGE UP (ref 40–120)
ALT FLD-CCNC: 12 U/L — SIGNIFICANT CHANGE UP (ref 10–45)
ANION GAP SERPL CALC-SCNC: 12 MMOL/L — SIGNIFICANT CHANGE UP (ref 5–17)
APPEARANCE UR: CLEAR — SIGNIFICANT CHANGE UP
AST SERPL-CCNC: 16 U/L — SIGNIFICANT CHANGE UP (ref 10–40)
BACTERIA # UR AUTO: PRESENT /HPF
BASOPHILS # BLD AUTO: 0.06 K/UL — SIGNIFICANT CHANGE UP (ref 0–0.2)
BASOPHILS NFR BLD AUTO: 0.8 % — SIGNIFICANT CHANGE UP (ref 0–2)
BILIRUB SERPL-MCNC: 0.3 MG/DL — SIGNIFICANT CHANGE UP (ref 0.2–1.2)
BILIRUB UR-MCNC: NEGATIVE — SIGNIFICANT CHANGE UP
BUN SERPL-MCNC: 9 MG/DL — SIGNIFICANT CHANGE UP (ref 7–23)
CALCIUM SERPL-MCNC: 9 MG/DL — SIGNIFICANT CHANGE UP (ref 8.4–10.5)
CHLORIDE SERPL-SCNC: 103 MMOL/L — SIGNIFICANT CHANGE UP (ref 96–108)
CO2 SERPL-SCNC: 25 MMOL/L — SIGNIFICANT CHANGE UP (ref 22–31)
COLOR SPEC: YELLOW — SIGNIFICANT CHANGE UP
CREAT SERPL-MCNC: 0.49 MG/DL — LOW (ref 0.5–1.3)
DIFF PNL FLD: NEGATIVE — SIGNIFICANT CHANGE UP
EOSINOPHIL # BLD AUTO: 0.05 K/UL — SIGNIFICANT CHANGE UP (ref 0–0.5)
EOSINOPHIL NFR BLD AUTO: 0.7 % — SIGNIFICANT CHANGE UP (ref 0–6)
EPI CELLS # UR: SIGNIFICANT CHANGE UP /HPF (ref 0–5)
GLUCOSE SERPL-MCNC: 129 MG/DL — HIGH (ref 70–99)
GLUCOSE UR QL: NEGATIVE — SIGNIFICANT CHANGE UP
HCG SERPL-ACNC: <0 MIU/ML — SIGNIFICANT CHANGE UP
HCT VFR BLD CALC: 31.7 % — LOW (ref 34.5–45)
HGB BLD-MCNC: 9.6 G/DL — LOW (ref 11.5–15.5)
HYALINE CASTS # UR AUTO: ABNORMAL /LPF (ref 0–2)
IMM GRANULOCYTES NFR BLD AUTO: 0.3 % — SIGNIFICANT CHANGE UP (ref 0–1.5)
KETONES UR-MCNC: NEGATIVE — SIGNIFICANT CHANGE UP
LEUKOCYTE ESTERASE UR-ACNC: NEGATIVE — SIGNIFICANT CHANGE UP
LYMPHOCYTES # BLD AUTO: 2.55 K/UL — SIGNIFICANT CHANGE UP (ref 1–3.3)
LYMPHOCYTES # BLD AUTO: 34.1 % — SIGNIFICANT CHANGE UP (ref 13–44)
MCHC RBC-ENTMCNC: 23.6 PG — LOW (ref 27–34)
MCHC RBC-ENTMCNC: 30.3 GM/DL — LOW (ref 32–36)
MCV RBC AUTO: 77.9 FL — LOW (ref 80–100)
MONOCYTES # BLD AUTO: 0.69 K/UL — SIGNIFICANT CHANGE UP (ref 0–0.9)
MONOCYTES NFR BLD AUTO: 9.2 % — SIGNIFICANT CHANGE UP (ref 2–14)
NEUTROPHILS # BLD AUTO: 4.1 K/UL — SIGNIFICANT CHANGE UP (ref 1.8–7.4)
NEUTROPHILS NFR BLD AUTO: 54.9 % — SIGNIFICANT CHANGE UP (ref 43–77)
NITRITE UR-MCNC: NEGATIVE — SIGNIFICANT CHANGE UP
NRBC # BLD: 0 /100 WBCS — SIGNIFICANT CHANGE UP (ref 0–0)
PH UR: 6 — SIGNIFICANT CHANGE UP (ref 5–8)
PLATELET # BLD AUTO: 567 K/UL — HIGH (ref 150–400)
POTASSIUM SERPL-MCNC: 4.1 MMOL/L — SIGNIFICANT CHANGE UP (ref 3.5–5.3)
POTASSIUM SERPL-SCNC: 4.1 MMOL/L — SIGNIFICANT CHANGE UP (ref 3.5–5.3)
PROT SERPL-MCNC: 8.3 G/DL — SIGNIFICANT CHANGE UP (ref 6–8.3)
PROT UR-MCNC: 30 MG/DL
RBC # BLD: 4.07 M/UL — SIGNIFICANT CHANGE UP (ref 3.8–5.2)
RBC # FLD: 25.7 % — HIGH (ref 10.3–14.5)
RBC CASTS # UR COMP ASSIST: < 5 /HPF — SIGNIFICANT CHANGE UP
SARS-COV-2 RNA SPEC QL NAA+PROBE: SIGNIFICANT CHANGE UP
SODIUM SERPL-SCNC: 140 MMOL/L — SIGNIFICANT CHANGE UP (ref 135–145)
SP GR SPEC: 1.02 — SIGNIFICANT CHANGE UP (ref 1–1.03)
UROBILINOGEN FLD QL: 0.2 E.U./DL — SIGNIFICANT CHANGE UP
WBC # BLD: 7.47 K/UL — SIGNIFICANT CHANGE UP (ref 3.8–10.5)
WBC # FLD AUTO: 7.47 K/UL — SIGNIFICANT CHANGE UP (ref 3.8–10.5)
WBC UR QL: < 5 /HPF — SIGNIFICANT CHANGE UP

## 2021-05-19 PROCEDURE — 87661 TRICHOMONAS VAGINALIS AMPLIF: CPT

## 2021-05-19 PROCEDURE — 76830 TRANSVAGINAL US NON-OB: CPT | Mod: 26

## 2021-05-19 PROCEDURE — 81001 URINALYSIS AUTO W/SCOPE: CPT

## 2021-05-19 PROCEDURE — 76856 US EXAM PELVIC COMPLETE: CPT

## 2021-05-19 PROCEDURE — 99284 EMERGENCY DEPT VISIT MOD MDM: CPT | Mod: CS

## 2021-05-19 PROCEDURE — 87086 URINE CULTURE/COLONY COUNT: CPT

## 2021-05-19 PROCEDURE — 87798 DETECT AGENT NOS DNA AMP: CPT

## 2021-05-19 PROCEDURE — 87070 CULTURE OTHR SPECIMN AEROBIC: CPT

## 2021-05-19 PROCEDURE — 76856 US EXAM PELVIC COMPLETE: CPT | Mod: 26

## 2021-05-19 PROCEDURE — 87563 M. GENITALIUM AMP PROBE: CPT

## 2021-05-19 PROCEDURE — U0003: CPT

## 2021-05-19 PROCEDURE — 87801 DETECT AGNT MULT DNA AMPLI: CPT

## 2021-05-19 PROCEDURE — 36415 COLL VENOUS BLD VENIPUNCTURE: CPT

## 2021-05-19 PROCEDURE — 74177 CT ABD & PELVIS W/CONTRAST: CPT | Mod: 26,MG

## 2021-05-19 PROCEDURE — 74177 CT ABD & PELVIS W/CONTRAST: CPT

## 2021-05-19 PROCEDURE — U0005: CPT

## 2021-05-19 PROCEDURE — 87591 N.GONORRHOEAE DNA AMP PROB: CPT

## 2021-05-19 PROCEDURE — 99284 EMERGENCY DEPT VISIT MOD MDM: CPT | Mod: 25

## 2021-05-19 PROCEDURE — 85025 COMPLETE CBC W/AUTO DIFF WBC: CPT

## 2021-05-19 PROCEDURE — 84702 CHORIONIC GONADOTROPIN TEST: CPT

## 2021-05-19 PROCEDURE — G1004: CPT

## 2021-05-19 PROCEDURE — 80053 COMPREHEN METABOLIC PANEL: CPT

## 2021-05-19 PROCEDURE — 87491 CHLMYD TRACH DNA AMP PROBE: CPT

## 2021-05-19 PROCEDURE — 76830 TRANSVAGINAL US NON-OB: CPT

## 2021-05-19 RX ORDER — KETOROLAC TROMETHAMINE 30 MG/ML
30 SYRINGE (ML) INJECTION ONCE
Refills: 0 | Status: DISCONTINUED | OUTPATIENT
Start: 2021-05-19 | End: 2021-05-19

## 2021-05-19 RX ADMIN — Medication 30 MILLIGRAM(S): at 10:44

## 2021-05-19 RX ADMIN — Medication 30 MILLIGRAM(S): at 11:30

## 2021-05-19 NOTE — ED PROVIDER NOTE - PROGRESS NOTE DETAILS
CT shows b/l TOA which is slightly decreased in size.  Pt comfortable in ED, pt seen by chief GYN resident and attending in ED Dr. Vines, do not recommend admission or further abx, report pt will need surgery in the next 3 months, not emergently today, can continue pain meds, f/u in clinic, return to ED if sx worsen.  Plan discussed with pt who agrees

## 2021-05-19 NOTE — ED ADULT NURSE NOTE - CHPI ED NUR SYMPTOMS NEG
no back pain/no coffee grounds emesis/no discharge/no fever/no nausea/no vaginal discharge/no vomiting

## 2021-05-19 NOTE — CONSULT NOTE ADULT - ASSESSMENT
34yo  w/PMH of morbid obesity presenting for abdominal pain likely secondary to resolving b/l TOAs  -pt afebrile, no leukocytosis, pt does not appear toxic. vitals WNL   -no acute gyn invention, continue outpatient monitoring     pt seen with Dr Ryan Mcnally   d/w Dr Vines

## 2021-05-19 NOTE — ED ADULT NURSE NOTE - CHIEF COMPLAINT QUOTE
x 1 day of sharp intermittent lower abdominal pain. PMH of fibromyalgia and ovarian cysts. saw obgyn today and was told to come to ED. denies fevers, chills, N/V/D, urinary compilations. LMP 3 weeks ago

## 2021-05-19 NOTE — ED PROVIDER NOTE - PATIENT PORTAL LINK FT
You can access the FollowMyHealth Patient Portal offered by [facility] by registering at the following website: [email or web address]. By joining [employer]’s FollowMyHealth portal, you will also be able to view your health information using other applications (apps) compatible with our system.

## 2021-05-19 NOTE — ED ADULT TRIAGE NOTE - OTHER COMPLAINTS
d/c from [facility] on april 23rd after admission  for similar symptoms. d/c from [facility] on April 23rd after admission for similar symptoms.

## 2021-05-19 NOTE — CONSULT NOTE ADULT - SUBJECTIVE AND OBJECTIVE BOX
35y  w/PMH of morbid obesity presenting for severe abdominal pain. Patient was admitted to gyn  for fever, chills, n/v, elevated WBC found to be septic secondary to b/l TOA. She was treated with IV abx, then transitioned and dc'd with po abx(clindamycin 150mg q4h, doxy 100mg q12h, flagyl 500mg q6h [id number]). She presents today after persistent 6/10 lower abdominal pain. She went to her GYN on [address] who advised her to come to the ED. Endorses irregular menses, not currently bleeding. Reports she intermittently has vaginal bleeding twice a month. She denies abnormal vaginal discharge or foul smell. She denies history of fibroids, abnormal papsmears, ovarian cysts. Denies, chest pain, palpitations, SOB. +flatus, +BM    OB H/x:  SAB x3    GYN H/x: denies history of fibroids, abnormal papsmears, ovarian cysts, STDs  Last sexually active 3 years ago w/men only  Hx of abnormal menses, irregular    MED H/x: denies    SURG H/x: denies    Medications: denies  Allergies: NKDA      PHYSICAL EXAM:   Vital Signs Last 24 Hrs  T(C): 36.8 (19 May 2021 14:56), Max: 36.8 (19 May 2021 14:56)  T(F): 98.2 (19 May 2021 14:56), Max: 98.2 (19 May 2021 14:56)  HR: 88 (19 May 2021 17:17) (85 - 107)  BP: 168/101 (19 May 2021 17:17) (130/96 - 168/101)  BP(mean): --  RR: 17 (19 May 2021 17:17) (16 - 18)  SpO2: 99% (19 May 2021 17:17) (97% - 99%)    **************************  Constitutional: Alert & Oriented x3, No acute distress  Respiratory: no additional work of breathing  Cardiovascular: one time HR of 107 upon arrival. since wnl, regular rate and rhythm.  Gastrointestinal: soft, suprapubic TTP, no rebound or guarding   Pelvic exam: limited due to body habitus. normal external vaginal mucosa, no VB or discharge seeing in the vault. os visualized. inguinal skin erosions well healed    Extremities: WNL    LABS:                        9.6    7.47  )-----------( 567      ( 19 May 2021 10:41 )             31.7         140  |  103  |  9   ----------------------------<  129<H>  4.1   |  25  |  0.49<L>    Ca    9.0      19 May 2021 10:41    TPro  8.3  /  Alb  4.4  /  TBili  0.3  /  DBili  x   /  AST  16  /  ALT  12  /  AlkPhos  69        Urinalysis Basic - ( 19 May 2021 11:46 )    Color: Yellow / Appearance: Clear / S.025 / pH: x  Gluc: x / Ketone: NEGATIVE  / Bili: Negative / Urobili: 0.2 E.U./dL   Blood: x / Protein: 30 mg/dL / Nitrite: NEGATIVE   Leuk Esterase: NEGATIVE / RBC: < 5 /HPF / WBC < 5 /HPF   Sq Epi: x / Non Sq Epi: 0-5 /HPF / Bacteria: Present /HPF      HCG Quantitative, Serum: <0 mIU/mL ( @ 10:41)      RADIOLOGY & ADDITIONAL STUDIES:  < from: CT Abdomen and Pelvis w/ IV Cont (21 @ 14:13) >  EXAM:  CT ABDOMEN AND PELVIS IC                          PROCEDURE DATE:  2021          INTERPRETATION:  CLINICAL INFORMATION: Status post Bilateral tubo-ovarian abscess, bilateral lower abdominal pain and pelvic pain    COMPARISON: Ultrasound on same day, MR 2021.    PROCEDURE:  CT of the Abdomen and Pelvis was performed with intravenous contrast.  Intravenous contrast: 90 ml Omnipaque 350. 10 ml discarded.  Oral contrast: None.  Sagittal and coronal reformats were performed.    FINDINGS:    Lower chest: Within normal limits.    Liver: Normal in size and morphology. No focal abnormality. The main portal vein is patent.  Gallbladder and biliary ducts: No gallstones. No intra/extrahepatic biliary ductal dilatation.  Spleen: Withinnormal limits.  Pancreas: Within normal limits.  Adrenals: Within normal limits.  Kidneys/ureters: No hydronephrosis. No urinary calculi. Focal nodular densities in both kidneys, unchanged angiomyolipoma.    Bladder: Within normal limits.  Reproductive organs/peritoneum: Rim-enhancing cystic structures in bilateral adenxae: 3.2 x 2.5 x 5.2 cm on the right, 5.3 x 7.6 x 8.1 cm on the left.  Again noted enlarged uterus with multiple subserosal fibroids and possible submucosal fibroid, as seen in prior MR. Previously seen rim enhancing collection in the cul-de sac is near completely resolved, with small residual fluid is seen posterior to the left adnexal collection (4:94), 2.7 x 1.9 cm.    Bowel: The bowel is normal in caliber.  Peritoneum/retroperitoneum: No ascites.  Vasculature:  The aorta and its branches are normal in caliber.  Lymph nodes: Lymph nodes are not enlarged.  Bones and soft tissue: Unremarkable.      IMPRESSION:  Slightly decreased size of bilateral tubo-ovarian abscesses. Near-complete resolution of collection in the cul-de-sac.          Thank you for the opportunity to participate in the care of this patient.      BILL MORSE MD; Attending Radiologist  This document has been electronically signed. May 19 2021  2:46PM    < end of copied text >

## 2021-05-19 NOTE — ED PROVIDER NOTE - CLINICAL SUMMARY MEDICAL DECISION MAKING FREE TEXT BOX
34 y/o F patient, s/p hospital admission for bilateral TOA, presents to ED with lower abdominal/pelvic pain.     In the ED, patient is afebrile, was slightly tachycardic at triage. Pelvic exam is unremarkable, abdominal exam noted to with bilateral lower abdominal tenderness to palpation.     Plan: Will order labs. Will administer pain management and reassess.

## 2021-05-19 NOTE — ED PROVIDER NOTE - OBJECTIVE STATEMENT
36 y/o st recent admissions for treatment of TOA, x3 weeks status post cessation of oral/IV antibiotics from outpatient services presents to the ED c/o bilateral lower abdominal/pelvic pain for x1 day. Pain is described as sharp and cramping, and is noted to feel like patient is starting her menstrual period.  Patient was seen at routine follow up with ob/gyn today and was referred to the ED as patient reported her pain to the practitioner. Pt states pain feel different from her symptoms she was being treated for in her recent hospital admission. Patient took ibuprofen with no significant symptom improvement. Patient denies any fever, chills, nausea, vomiting, urinary symptoms, cough, sexual intercourse, or vaginal discharge. 36 y/o st recent admissions for treatment of TOA, x3 weeks status post cessation of oral/IV antibiotics from outpatient services presents to the ED c/o bilateral lower abdominal/pelvic pain for x1 day. Pain is described as sharp and cramping, and is noted to feel like patient is starting her menstrual period. Patient was seen at routine follow up with ob/gyn today and was referred to the ED as patient reported her pain to the practitioner. Pt states pain feel different from her symptoms she was being treated for in her recent hospital admission. Patient took ibuprofen with no significant symptom improvement. Patient denies any fever, chills, nausea, vomiting, urinary symptoms, cough, sexual intercourse, or vaginal discharge.

## 2021-05-19 NOTE — ED ADULT TRIAGE NOTE - CHIEF COMPLAINT QUOTE
x 1 day of sharp intermittent lower abdominal pain. PMH of fibromyalgia and ovarian cysts. saw obgyn today and was told to come to ED. denies fevers, chills, N/V/D, urinary compilations. LMP 3 weeks ago x 1 day of sharp intermittent lower abdominal pain. PMH of fibromyalgia and ovarian cysts. saw obgyn today and was told to come to ED. denies fevers, chills, N/V/D, urinary complications. LMP 3 weeks ago

## 2021-05-19 NOTE — ED ADULT NURSE NOTE - OBJECTIVE STATEMENT
35y F, A&Ox3, presents to ed for lower abdominal pain since yesterday, approximately 7/10. Reports recently admitted under GYN x3 weeks ago. Reports pain today is less severe than x3 weeks ago.  Pt went to her GYN today and was told to come in for evaluation. Reports intermittent bleeding x2 weeks ago however no active bleeding. PT also reports her menses should be starting soon. Denies urinary s/s. no n/v/d. no fevers nor chills. no cp, no sob.

## 2021-05-19 NOTE — ED PROVIDER NOTE - ATTENDING CONTRIBUTION TO CARE
36yo F morbid obesity, hx of bilateral TOA earlier this year s/p IV and po abx, finished 3 weeks ago, was feeling better, but since yesterday w/ bilateral lower abdominal pelvic pain. due to have menses, unsure if this or recurrent. No fevers, mild tachycardia, comfortable appearing. pelvic unremarkable as per PA. plan for US/CT to assess her known TOA, r/o worsening.

## 2021-05-20 LAB
C TRACH RRNA SPEC QL NAA+PROBE: SIGNIFICANT CHANGE UP
CULTURE RESULTS: SIGNIFICANT CHANGE UP
N GONORRHOEA RRNA SPEC QL NAA+PROBE: SIGNIFICANT CHANGE UP
SPECIMEN SOURCE: SIGNIFICANT CHANGE UP
SPECIMEN SOURCE: SIGNIFICANT CHANGE UP

## 2021-05-21 LAB
CULTURE RESULTS: SIGNIFICANT CHANGE UP
SPECIMEN SOURCE: SIGNIFICANT CHANGE UP

## 2021-05-23 LAB
A VAGINAE DNA VAG QL NAA+PROBE: SIGNIFICANT CHANGE UP
BVAB2 DNA VAG QL NAA+PROBE: SIGNIFICANT CHANGE UP
C ALBICANS DNA VAG QL NAA+PROBE: NEGATIVE — SIGNIFICANT CHANGE UP
C GLABRATA DNA VAG QL NAA+PROBE: NEGATIVE — SIGNIFICANT CHANGE UP
M GENITALIUM DNA SPEC QL NAA+PROBE: NEGATIVE — SIGNIFICANT CHANGE UP
M HOMINIS DNA SPEC QL NAA+PROBE: NEGATIVE — SIGNIFICANT CHANGE UP
MEGA1 DNA VAG QL NAA+PROBE: SIGNIFICANT CHANGE UP
T VAGINALIS RRNA SPEC QL NAA+PROBE: NEGATIVE — SIGNIFICANT CHANGE UP
UREAPLASMA DNA SPEC QL NAA+PROBE: NEGATIVE — SIGNIFICANT CHANGE UP

## 2021-09-09 NOTE — DISCHARGE NOTE PROVIDER - HOSPITAL COURSE
The soonest appt I can find for the patient is 9/24. 34 yo F P0 with PMHx of morbid obesity comes in with sepsis secondary to bilateral tuboovarian abscesses 6 days ago.  Pt was treated with IV antibiotics and transferred to PO antibiotics after Day 5.  Pt was tested for all disease clinically related to TOA, and has been found negative. Pt now is aleukocytosis, afebrile with stable vitals.  Pt is clinically stable for discharge and will be following up outpatient with [facility]. Pt is currently being treated with Clindamycin, Doxycyline and Flagyl. 35y  HD5 with PMH of morbid obesity presented for severe abdominal pain, n/v and fevers at home found to be septic secondary to bilateral tuboovarian abscesses. Now pt is hemodynamically stable and afebrile.  IR was consulted during pt stay and was unable to drain abscesses due to position.  Pt was treated with IV antibiotics and transferred to PO antibiotics after Day 5, pt will receive antibiotics for a total duration of 14 days.  Pt had blood culture, vaginitis panel, vaginal culture which are all being followed up. Pt was found to have skin infection on thighs during stay and was treated with diflucan and seen by wound care,  pt will go home with zinc oxide. Pt is clinically stable for discharge and will be following up outpatient with [facility] clinic next week, given information. Pt will go home with 8 more days of with Clindamycin, Doxycyline and Flagyl.

## 2022-03-03 NOTE — PHYSICAL THERAPY INITIAL EVALUATION ADULT - PHYSICAL ASSIST/NONPHYSICAL ASSIST: GAIT, REHAB EVAL
Assessment: Hyperlipidemia.  Plan: Continue atorvastatin 20 mg at bedtime.  Lipid panel return the office in 2 months.  
To Dr. Childress, please advise  Last seen 10-29-20    Last filled 10-29-20  
verbal cues/nonverbal cues (demo/gestures)/1 person assist

## 2025-03-10 NOTE — ADVANCED PRACTICE NURSE CONSULT - RECOMMEDATIONS
Requested Prescriptions   Pending Prescriptions Disp Refills    norethindrone (MICRONOR) 0.35 MG tablet [Pharmacy Med Name: NORETHINDRONE 0.35MG TABS] 84 tablet 0     Sig: TAKE ONE TABLET BY MOUTH ONCE DAILY       Contraceptives Protocol Failed - 3/10/2025 10:04 AM        Failed - Medication indicated for associated diagnosis     Medication is associated with one or more of the following diagnoses:  Contraception  Acne  Dysmenorrhea  Menorrhagia  Amenorrhea  PCOS  Premenstrual Dysphoric Disorder  Irregular menses  Endometriosis  Contraceptive counseling  Finding of menstrual bleeding  Education about oral contraception  Uses contraception  Initial prescription of oral contraception  Oral contraception-no problem  Oral contraceptive repeat          Passed - Patient is not a current smoker if age is 35 or older        Passed - Medication is active on med list and the sig matches. RN to manually verify dose and sig if red X/fail.     If the protocol passes (green check), you do not need to verify med dose and sig.    A prescription matches if they are the same clinical intention.    For Example: once daily and every morning are the same.    The protocol can not identify upper and lower case letters as matching and will fail.     For Example: Take 1 tablet (50 mg) by mouth daily     TAKE 1 TABLET (50 MG) BY MOUTH DAILY    For all fails (red x), verify dose and sig.    If the refill does match what is on file, the RN can still proceed to approve the refill request.       If they do not match, route to the appropriate provider.             Passed - Recent (12 mo) or future (90 days) visit within the authorizing provider's specialty     The patient must have completed an in-person or virtual visit within the past 12 months or has a future visit scheduled within the next 90 days with the authorizing provider s specialty.  Urgent care and e-visits do not qualify as an office visit for this protocol.          Passed - No  To inguinal folds: Cleanse with pericare spray cleanser. Pat dry. Apply Interdry. Change twice daily and PRN for soiling or excessive moisture.    To perirectal/intergluteal cleft region: Cleanse with pericare spray cleanser. Pat dry. Apply Calmoseptine (at bedside). Apply twice daily and PRN for soiling or pt c/o burning/itching pain to area. DO NOT remove all ointment at every application; only remove soiled ointment and then reapply new application.    active pregnancy on record        Passed - No positive pregnancy test in past 12 months           Pt last seen 10/4/2024 for annual    Last prescribed 12/18/2024 for 84 tablets with 0 refills    Prescription approved per [facility] Refill Protocol. Refill protocol is incorrect- RN sent refill.    Nicole Barreto RN on 3/10/2025 at 10:26 AM     To inguinal folds: Cleanse with pericare spray cleanser. Pat dry. Apply Interdry. Change twice daily and PRN for soiling or excessive moisture.    To perirectal/intergluteal cleft region: Cleanse with pericare spray cleanser. Pat dry. Apply Calmoseptine (at bedside). Apply twice daily and PRN for soiling or pt c/o burning/itching pain to area. DO NOT remove all ointment at every application; only remove soiled ointment and then reapply new application.     -Bariatric bed ordered  -D/w ZEENAT Young and MD Allen Linares